# Patient Record
Sex: MALE | Race: OTHER | Employment: UNEMPLOYED | URBAN - METROPOLITAN AREA
[De-identification: names, ages, dates, MRNs, and addresses within clinical notes are randomized per-mention and may not be internally consistent; named-entity substitution may affect disease eponyms.]

---

## 2018-08-11 ENCOUNTER — APPOINTMENT (OUTPATIENT)
Dept: GENERAL RADIOLOGY | Age: 53
End: 2018-08-11
Attending: EMERGENCY MEDICINE
Payer: MEDICARE

## 2018-08-11 ENCOUNTER — HOSPITAL ENCOUNTER (EMERGENCY)
Age: 53
Discharge: HOME | End: 2018-08-11
Attending: EMERGENCY MEDICINE | Admitting: EMERGENCY MEDICINE
Payer: MEDICARE

## 2018-08-11 ENCOUNTER — APPOINTMENT (OUTPATIENT)
Dept: CT IMAGING | Age: 53
End: 2018-08-11
Attending: EMERGENCY MEDICINE
Payer: MEDICARE

## 2018-08-11 VITALS
RESPIRATION RATE: 20 BRPM | SYSTOLIC BLOOD PRESSURE: 162 MMHG | HEIGHT: 73 IN | DIASTOLIC BLOOD PRESSURE: 116 MMHG | OXYGEN SATURATION: 97 % | TEMPERATURE: 99 F | HEART RATE: 77 BPM

## 2018-08-11 DIAGNOSIS — R10.32 ABDOMINAL PAIN, LLQ (LEFT LOWER QUADRANT): Primary | ICD-10-CM

## 2018-08-11 DIAGNOSIS — E66.01 MORBID OBESITY (HCC): ICD-10-CM

## 2018-08-11 LAB
ALBUMIN SERPL-MCNC: 3.3 G/DL (ref 3.4–5)
ALBUMIN/GLOB SERPL: 0.8 {RATIO} (ref 0.8–1.7)
ALP SERPL-CCNC: 85 U/L (ref 45–117)
ALT SERPL-CCNC: 21 U/L (ref 16–61)
ANION GAP SERPL CALC-SCNC: 7 MMOL/L (ref 3–18)
APPEARANCE UR: CLEAR
AST SERPL-CCNC: 16 U/L (ref 15–37)
BACTERIA URNS QL MICRO: ABNORMAL /HPF
BASOPHILS # BLD: 0 K/UL (ref 0–0.1)
BASOPHILS NFR BLD: 0 % (ref 0–2)
BILIRUB SERPL-MCNC: 0.3 MG/DL (ref 0.2–1)
BILIRUB UR QL: NEGATIVE
BNP SERPL-MCNC: 457 PG/ML (ref 0–900)
BUN SERPL-MCNC: 10 MG/DL (ref 7–18)
BUN/CREAT SERPL: 7 (ref 12–20)
CALCIUM SERPL-MCNC: 8.3 MG/DL (ref 8.5–10.1)
CHLORIDE SERPL-SCNC: 106 MMOL/L (ref 100–108)
CO2 SERPL-SCNC: 28 MMOL/L (ref 21–32)
COLOR UR: YELLOW
CREAT SERPL-MCNC: 1.51 MG/DL (ref 0.6–1.3)
DIFFERENTIAL METHOD BLD: ABNORMAL
EOSINOPHIL # BLD: 0 K/UL (ref 0–0.4)
EOSINOPHIL NFR BLD: 0 % (ref 0–5)
EPITH CASTS URNS QL MICRO: ABNORMAL /LPF (ref 0–5)
ERYTHROCYTE [DISTWIDTH] IN BLOOD BY AUTOMATED COUNT: 13.6 % (ref 11.6–14.5)
GLOBULIN SER CALC-MCNC: 4.3 G/DL (ref 2–4)
GLUCOSE SERPL-MCNC: 118 MG/DL (ref 74–99)
GLUCOSE UR STRIP.AUTO-MCNC: NEGATIVE MG/DL
HCT VFR BLD AUTO: 43.6 % (ref 36–48)
HGB BLD-MCNC: 14.8 G/DL (ref 13–16)
HGB UR QL STRIP: ABNORMAL
KETONES UR QL STRIP.AUTO: NEGATIVE MG/DL
LACTATE BLD-SCNC: 1.9 MMOL/L (ref 0.4–2)
LEUKOCYTE ESTERASE UR QL STRIP.AUTO: NEGATIVE
LIPASE SERPL-CCNC: 104 U/L (ref 73–393)
LYMPHOCYTES # BLD: 1 K/UL (ref 0.9–3.6)
LYMPHOCYTES NFR BLD: 8 % (ref 21–52)
MAGNESIUM SERPL-MCNC: 2.2 MG/DL (ref 1.6–2.6)
MCH RBC QN AUTO: 29.7 PG (ref 24–34)
MCHC RBC AUTO-ENTMCNC: 33.9 G/DL (ref 31–37)
MCV RBC AUTO: 87.4 FL (ref 74–97)
MONOCYTES # BLD: 0.8 K/UL (ref 0.05–1.2)
MONOCYTES NFR BLD: 7 % (ref 3–10)
NEUTS SEG # BLD: 10.5 K/UL (ref 1.8–8)
NEUTS SEG NFR BLD: 85 % (ref 40–73)
NITRITE UR QL STRIP.AUTO: NEGATIVE
PH UR STRIP: 7 [PH] (ref 5–8)
PLATELET # BLD AUTO: 192 K/UL (ref 135–420)
PMV BLD AUTO: 10.8 FL (ref 9.2–11.8)
POTASSIUM SERPL-SCNC: 3.8 MMOL/L (ref 3.5–5.5)
PROT SERPL-MCNC: 7.6 G/DL (ref 6.4–8.2)
PROT UR STRIP-MCNC: ABNORMAL MG/DL
RBC # BLD AUTO: 4.99 M/UL (ref 4.7–5.5)
RBC #/AREA URNS HPF: ABNORMAL /HPF (ref 0–5)
SODIUM SERPL-SCNC: 141 MMOL/L (ref 136–145)
SP GR UR REFRACTOMETRY: 1.02 (ref 1–1.03)
UROBILINOGEN UR QL STRIP.AUTO: 0.2 EU/DL (ref 0.2–1)
WBC # BLD AUTO: 12.3 K/UL (ref 4.6–13.2)

## 2018-08-11 PROCEDURE — 85025 COMPLETE CBC W/AUTO DIFF WBC: CPT

## 2018-08-11 PROCEDURE — 96361 HYDRATE IV INFUSION ADD-ON: CPT

## 2018-08-11 PROCEDURE — 80053 COMPREHEN METABOLIC PANEL: CPT

## 2018-08-11 PROCEDURE — 99285 EMERGENCY DEPT VISIT HI MDM: CPT

## 2018-08-11 PROCEDURE — 74011250636 HC RX REV CODE- 250/636: Performed by: EMERGENCY MEDICINE

## 2018-08-11 PROCEDURE — 83605 ASSAY OF LACTIC ACID: CPT

## 2018-08-11 PROCEDURE — 83880 ASSAY OF NATRIURETIC PEPTIDE: CPT

## 2018-08-11 PROCEDURE — 96374 THER/PROPH/DIAG INJ IV PUSH: CPT

## 2018-08-11 PROCEDURE — 83735 ASSAY OF MAGNESIUM: CPT

## 2018-08-11 PROCEDURE — 83690 ASSAY OF LIPASE: CPT

## 2018-08-11 PROCEDURE — 71045 X-RAY EXAM CHEST 1 VIEW: CPT

## 2018-08-11 PROCEDURE — 81001 URINALYSIS AUTO W/SCOPE: CPT

## 2018-08-11 PROCEDURE — 93005 ELECTROCARDIOGRAM TRACING: CPT

## 2018-08-11 RX ORDER — HYDROMORPHONE HYDROCHLORIDE 1 MG/ML
1 INJECTION, SOLUTION INTRAMUSCULAR; INTRAVENOUS; SUBCUTANEOUS ONCE
Status: COMPLETED | OUTPATIENT
Start: 2018-08-11 | End: 2018-08-11

## 2018-08-11 RX ADMIN — SODIUM CHLORIDE 1000 ML: 900 INJECTION, SOLUTION INTRAVENOUS at 13:34

## 2018-08-11 RX ADMIN — HYDROMORPHONE HYDROCHLORIDE 1 MG: 1 INJECTION, SOLUTION INTRAMUSCULAR; INTRAVENOUS; SUBCUTANEOUS at 13:34

## 2018-08-11 NOTE — ED NOTES
I performed a brief evaluation, including history and physical, of the patient here in triage and I have determined that pt will need further treatment and evaluation from the main side ER physician. I have placed initial orders to help in expediting patients care.      August 11, 2018 at 12:47 PM - Fabio Sellers PA-C        Visit Vitals    BP (!) 154/95 (BP 1 Location: Left arm, BP Patient Position: At rest)    Pulse 87    Temp 99 °F (37.2 °C)    Resp 20    Ht 6' 1\" (1.854 m)    SpO2 95%

## 2018-08-11 NOTE — ED PROVIDER NOTES
EMERGENCY DEPARTMENT HISTORY AND PHYSICAL EXAM    1:00 PM      Date: 8/11/2018  Patient Name: Elli Andino    History of Presenting Illness     Chief Complaint   Patient presents with    Abdominal Pain         History Provided By: Patient    Chief Complaint: abdominal pain  Duration:  Last night, 1 day  Timing:  Acute  Location: left lower abdomen  Quality: non-radiating  Severity: Severe  Modifying Factors: history of hypertension,   Associated Symptoms: denies any other associated signs or symptoms      Additional History (Context): Elli Andino is a 46 y.o. male with a history of hypertension, MI, pacemaker who presents to the ED complaining of acute, severe, non-radiating, LLQ abdominal pain that started last night at 12 AM. Patient states that the pain was constant last night but then improved on it's own. Patient reports associated nausea but denies any vomiting, fevers, chills, urinary symptoms, testicular pain. Patient has had normal bowel movements. Patient has a pacemaker that was put in 2013 after sufffering an MI. No other complaints or concerns at this time. PCP: Jason Urrutia MD        Past History     Past Medical History:  No past medical history on file. Past Surgical History:  No past surgical history on file. Family History:  No family history on file. Social History:  Social History   Substance Use Topics    Smoking status: Not on file    Smokeless tobacco: Not on file    Alcohol use Not on file       Allergies:  No Known Allergies      Review of Systems       Review of Systems   Constitutional: Negative. Negative for fever. HENT: Negative. Eyes: Negative. Respiratory: Negative. Cardiovascular: Negative. Gastrointestinal: Positive for abdominal pain and nausea. Negative for abdominal distention, anal bleeding, blood in stool, constipation, diarrhea, rectal pain and vomiting. Normal BM this AM without change in pain. Endocrine: Negative.     Genitourinary: Negative. Negative for dysuria, flank pain, frequency, hematuria, penile pain, testicular pain and urgency. Musculoskeletal: Negative. Skin: Negative. Allergic/Immunologic: Negative. Neurological: Negative. Hematological: Negative. Psychiatric/Behavioral: Negative. All other systems reviewed and are negative. Physical Exam     Visit Vitals    BP (!) 154/95 (BP 1 Location: Left arm, BP Patient Position: At rest)    Pulse 87    Temp 99 °F (37.2 °C)    Resp 20    Ht 6' 1\" (1.854 m)    SpO2 95%         Physical Exam   Constitutional: He is oriented to person, place, and time. Extreme obesity- 500 Lb + LLQ pain acute onset midnight - Constant - some element of colic No radiation top flank. No other symptoms. HENT:   Head: Normocephalic and atraumatic. Eyes: Conjunctivae and EOM are normal. Pupils are equal, round, and reactive to light. Neck: Normal range of motion. Neck supple. No JVD present. No tracheal deviation present. No thyromegaly present. Cardiovascular: Normal rate, regular rhythm and normal heart sounds. Exam reveals no gallop. No murmur heard. Pulmonary/Chest: Effort normal and breath sounds normal. No respiratory distress. He has no wheezes. He has no rales. Abdominal: Soft. He exhibits no distension and no mass. There is tenderness. There is no rebound and no guarding. Massive abdominal obesity- LLQ tenderness is only accurate assessment. No CVA tendeness. Constant pain with an element of colic since midnight- was well prior to acute onset, Suspect ureteral calculus vs divertic. NOT SEPTIC    Musculoskeletal: Normal range of motion. He exhibits edema. Lymphadenopathy:     He has no cervical adenopathy. Neurological: He is alert and oriented to person, place, and time. Skin: Skin is warm.          Diagnostic Study Results     Labs -  Recent Results (from the past 12 hour(s))   CBC WITH AUTOMATED DIFF    Collection Time: 08/11/18  1:00 PM   Result Value Ref Range    WBC 12.3 4.6 - 13.2 K/uL    RBC 4.99 4.70 - 5.50 M/uL    HGB 14.8 13.0 - 16.0 g/dL    HCT 43.6 36.0 - 48.0 %    MCV 87.4 74.0 - 97.0 FL    MCH 29.7 24.0 - 34.0 PG    MCHC 33.9 31.0 - 37.0 g/dL    RDW 13.6 11.6 - 14.5 %    PLATELET 251 196 - 986 K/uL    MPV 10.8 9.2 - 11.8 FL    NEUTROPHILS 85 (H) 40 - 73 %    LYMPHOCYTES 8 (L) 21 - 52 %    MONOCYTES 7 3 - 10 %    EOSINOPHILS 0 0 - 5 %    BASOPHILS 0 0 - 2 %    ABS. NEUTROPHILS 10.5 (H) 1.8 - 8.0 K/UL    ABS. LYMPHOCYTES 1.0 0.9 - 3.6 K/UL    ABS. MONOCYTES 0.8 0.05 - 1.2 K/UL    ABS. EOSINOPHILS 0.0 0.0 - 0.4 K/UL    ABS. BASOPHILS 0.0 0.0 - 0.1 K/UL    DF AUTOMATED     METABOLIC PANEL, COMPREHENSIVE    Collection Time: 08/11/18  1:00 PM   Result Value Ref Range    Sodium 141 136 - 145 mmol/L    Potassium 3.8 3.5 - 5.5 mmol/L    Chloride 106 100 - 108 mmol/L    CO2 28 21 - 32 mmol/L    Anion gap 7 3.0 - 18 mmol/L    Glucose 118 (H) 74 - 99 mg/dL    BUN 10 7.0 - 18 MG/DL    Creatinine 1.51 (H) 0.6 - 1.3 MG/DL    BUN/Creatinine ratio 7 (L) 12 - 20      GFR est AA 59 (L) >60 ml/min/1.73m2    GFR est non-AA 48 (L) >60 ml/min/1.73m2    Calcium 8.3 (L) 8.5 - 10.1 MG/DL    Bilirubin, total 0.3 0.2 - 1.0 MG/DL    ALT (SGPT) 21 16 - 61 U/L    AST (SGOT) 16 15 - 37 U/L    Alk.  phosphatase 85 45 - 117 U/L    Protein, total 7.6 6.4 - 8.2 g/dL    Albumin 3.3 (L) 3.4 - 5.0 g/dL    Globulin 4.3 (H) 2.0 - 4.0 g/dL    A-G Ratio 0.8 0.8 - 1.7     LIPASE    Collection Time: 08/11/18  1:00 PM   Result Value Ref Range    Lipase 104 73 - 393 U/L   MAGNESIUM    Collection Time: 08/11/18  1:00 PM   Result Value Ref Range    Magnesium 2.2 1.6 - 2.6 mg/dL   NT-PRO BNP    Collection Time: 08/11/18  1:00 PM   Result Value Ref Range    NT pro- 0 - 900 PG/ML   POC LACTIC ACID    Collection Time: 08/11/18  1:36 PM   Result Value Ref Range    Lactic Acid (POC) 1.9 0.4 - 2.0 mmol/L   URINALYSIS W/ RFLX MICROSCOPIC    Collection Time: 08/11/18  1:39 PM   Result Value Ref Range    Color YELLOW      Appearance CLEAR      Specific gravity 1.019 1.005 - 1.030      pH (UA) 7.0 5.0 - 8.0      Protein TRACE (A) NEG mg/dL    Glucose NEGATIVE  NEG mg/dL    Ketone NEGATIVE  NEG mg/dL    Bilirubin NEGATIVE  NEG      Blood SMALL (A) NEG      Urobilinogen 0.2 0.2 - 1.0 EU/dL    Nitrites NEGATIVE  NEG      Leukocyte Esterase NEGATIVE  NEG     URINE MICROSCOPIC ONLY    Collection Time: 08/11/18  1:39 PM   Result Value Ref Range    RBC 4 to 10 0 - 5 /hpf    Epithelial cells FEW 0 - 5 /lpf    Bacteria FEW (A) NEG /hpf   EKG, 12 LEAD, INITIAL    Collection Time: 08/11/18  2:14 PM   Result Value Ref Range    Ventricular Rate 101 BPM    Atrial Rate 80 BPM    P-R Interval 140 ms    QRS Duration 98 ms    Q-T Interval 308 ms    QTC Calculation (Bezet) 399 ms    Calculated P Axis 48 degrees    Calculated R Axis -42 degrees    Calculated T Axis -2 degrees    Diagnosis       Electronic ventricular pacemaker  No previous ECGs available         Radiologic Studies -   XR CHEST PORT   Final Result        CXR Cardiomegaly. Medical Decision Making   I am the first provider for this patient. I reviewed the vital signs, available nursing notes, past medical history, past surgical history, family history and social history. Vital Signs-Reviewed the patient's vital signs. Pulse Oximetry Analysis -  95% on room air (Interpretation)normal.    Cardiac Monitor:  Rate:   Rhythm:  Sinus Tachycardia rate 101    EKG: Interpreted by the EP. Time Interpreted:    RSR rate 101, LAD, Normal intervals, LVH by voltage criteria,  Peeked T waves   Rate:    Rhythm: Sinus Tachycardia rate 101   Interpretation:   Comparison: none- From [place]. Records Reviewed: none (Time of Review: 1:00 PM)    ED Course: Progress Notes, Reevaluation, and Consults:  Remained stable - Given IV fluids. At 3:50 PM evaluated for pain and abdominal findings: Totally free of pain and abdominal tenderness. To be re examined in the AM . Unable to obtain CT here . Called [facility] radiology, they are also limited to 400 lbs. Fortunately the patient is asymptomatic at this time. Afebrile, mild shift to the left. But not septic. Expectant therapy- Clear liquids, no meds, recheck in AM if recurrent pain/ or sooner    1  Diagnosis     Clinical Impression:   Recent Results (from the past 12 hour(s))   CBC WITH AUTOMATED DIFF    Collection Time: 08/11/18  1:00 PM   Result Value Ref Range    WBC 12.3 4.6 - 13.2 K/uL    RBC 4.99 4.70 - 5.50 M/uL    HGB 14.8 13.0 - 16.0 g/dL    HCT 43.6 36.0 - 48.0 %    MCV 87.4 74.0 - 97.0 FL    MCH 29.7 24.0 - 34.0 PG    MCHC 33.9 31.0 - 37.0 g/dL    RDW 13.6 11.6 - 14.5 %    PLATELET 672 559 - 302 K/uL    MPV 10.8 9.2 - 11.8 FL    NEUTROPHILS 85 (H) 40 - 73 %    LYMPHOCYTES 8 (L) 21 - 52 %    MONOCYTES 7 3 - 10 %    EOSINOPHILS 0 0 - 5 %    BASOPHILS 0 0 - 2 %    ABS. NEUTROPHILS 10.5 (H) 1.8 - 8.0 K/UL    ABS. LYMPHOCYTES 1.0 0.9 - 3.6 K/UL    ABS. MONOCYTES 0.8 0.05 - 1.2 K/UL    ABS. EOSINOPHILS 0.0 0.0 - 0.4 K/UL    ABS. BASOPHILS 0.0 0.0 - 0.1 K/UL    DF AUTOMATED     METABOLIC PANEL, COMPREHENSIVE    Collection Time: 08/11/18  1:00 PM   Result Value Ref Range    Sodium 141 136 - 145 mmol/L    Potassium 3.8 3.5 - 5.5 mmol/L    Chloride 106 100 - 108 mmol/L    CO2 28 21 - 32 mmol/L    Anion gap 7 3.0 - 18 mmol/L    Glucose 118 (H) 74 - 99 mg/dL    BUN 10 7.0 - 18 MG/DL    Creatinine 1.51 (H) 0.6 - 1.3 MG/DL    BUN/Creatinine ratio 7 (L) 12 - 20      GFR est AA 59 (L) >60 ml/min/1.73m2    GFR est non-AA 48 (L) >60 ml/min/1.73m2    Calcium 8.3 (L) 8.5 - 10.1 MG/DL    Bilirubin, total 0.3 0.2 - 1.0 MG/DL    ALT (SGPT) 21 16 - 61 U/L    AST (SGOT) 16 15 - 37 U/L    Alk.  phosphatase 85 45 - 117 U/L    Protein, total 7.6 6.4 - 8.2 g/dL    Albumin 3.3 (L) 3.4 - 5.0 g/dL    Globulin 4.3 (H) 2.0 - 4.0 g/dL    A-G Ratio 0.8 0.8 - 1.7     LIPASE    Collection Time: 08/11/18  1:00 PM   Result Value Ref Range    Lipase 104 73 - 393 U/L MAGNESIUM    Collection Time: 08/11/18  1:00 PM   Result Value Ref Range    Magnesium 2.2 1.6 - 2.6 mg/dL   NT-PRO BNP    Collection Time: 08/11/18  1:00 PM   Result Value Ref Range    NT pro- 0 - 900 PG/ML   POC LACTIC ACID    Collection Time: 08/11/18  1:36 PM   Result Value Ref Range    Lactic Acid (POC) 1.9 0.4 - 2.0 mmol/L   URINALYSIS W/ RFLX MICROSCOPIC    Collection Time: 08/11/18  1:39 PM   Result Value Ref Range    Color YELLOW      Appearance CLEAR      Specific gravity 1.019 1.005 - 1.030      pH (UA) 7.0 5.0 - 8.0      Protein TRACE (A) NEG mg/dL    Glucose NEGATIVE  NEG mg/dL    Ketone NEGATIVE  NEG mg/dL    Bilirubin NEGATIVE  NEG      Blood SMALL (A) NEG      Urobilinogen 0.2 0.2 - 1.0 EU/dL    Nitrites NEGATIVE  NEG      Leukocyte Esterase NEGATIVE  NEG     URINE MICROSCOPIC ONLY    Collection Time: 08/11/18  1:39 PM   Result Value Ref Range    RBC 4 to 10 0 - 5 /hpf    Epithelial cells FEW 0 - 5 /lpf    Bacteria FEW (A) NEG /hpf   EKG, 12 LEAD, INITIAL    Collection Time: 08/11/18  2:14 PM   Result Value Ref Range    Ventricular Rate 101 BPM    Atrial Rate 80 BPM    P-R Interval 140 ms    QRS Duration 98 ms    Q-T Interval 308 ms    QTC Calculation (Bezet) 399 ms    Calculated P Axis 48 degrees    Calculated R Axis -42 degrees    Calculated T Axis -2 degrees    Diagnosis       Electronic ventricular pacemaker  No previous ECGs available         Medications ordered:   Medications   sodium chloride 0.9 % bolus infusion 1,000 mL (1,000 mL IntraVENous New Bag 8/11/18 1334)   HYDROmorphone (PF) (DILAUDID) injection 1 mg (1 mg IntraVENous Given 8/11/18 1334)         Xr Chest Port    Result Date: 8/11/2018  CHEST PORTABLE CPT CODE: 26898 COMPARISON: None INDICATIONS: Abdominal pain nausea and vomiting FINDINGS: There is a left-sided bipolar pacemaker. The heart is enlarged. The study is somewhat limited due to the patient's large size. There may be some mild pulmonary vascular congestion.  No pleural effusions are seen. No significant osseous abnormalities. Impression: Limited study due to patient's size. Cardiomegaly. There is suggestion of mild pulmonary vascular congestion. 1. Abdominal pain, LLQ (left lower quadrant)    2. Morbid obesity ([address]            Disposition: home    Follow-up Information     Follow up With Details Comments Contact Info    Phys Other, MD   Patient can only remember the practice name and not the physician      [facility] In 1 day Recheck in AM or sooner if pain/fever recurs. [address] [facility] [id number]  [phone]           Patient's Medications    No medications on file     _______________________________    Attestations:  Scribe Attestation     Catherine Vickers acting as a scribe for and in the presence of Cecilia Whitehead MD      August 11, 2018 at 2:03 PM       Provider Attestation:      I personally performed the services described in the documentation, reviewed the documentation, as recorded by the scribe in my presence, and it accurately and completely records my words and actions.  August 11, 2018 at 2:03 PM - Cecilia Whitehead MD    _______________________________

## 2018-08-12 LAB
ATRIAL RATE: 80 BPM
CALCULATED P AXIS, ECG09: 48 DEGREES
CALCULATED R AXIS, ECG10: -42 DEGREES
CALCULATED T AXIS, ECG11: -2 DEGREES
DIAGNOSIS, 93000: NORMAL
P-R INTERVAL, ECG05: 140 MS
Q-T INTERVAL, ECG07: 308 MS
QRS DURATION, ECG06: 98 MS
QTC CALCULATION (BEZET), ECG08: 399 MS
VENTRICULAR RATE, ECG03: 101 BPM

## 2018-09-21 ENCOUNTER — DOCUMENTATION ONLY (OUTPATIENT)
Dept: INTERNAL MEDICINE CLINIC | Age: 53
End: 2018-09-21

## 2018-09-21 NOTE — PROGRESS NOTES
Pharmacy Note: Immunization Update    Patient: Marquis Busch (26 y.o., 1965)     Patient's immunization history was updated to reflect information contained in the [place] and/or outside immunization/pharmacy records were reconciled within [address] [facility]. Health Maintenance schedule updated when appropriate.     Current immunizations now reflect:       Immunization History   Administered Date(s) Administered    Influenza Vaccine (Quad) 09/04/2018       Yoni Meyers, JasD, BCACP

## 2019-06-19 ENCOUNTER — HOSPITAL ENCOUNTER (OUTPATIENT)
Dept: GENERAL RADIOLOGY | Age: 54
Discharge: HOME | End: 2019-06-19
Payer: MEDICARE

## 2019-06-19 DIAGNOSIS — M25.561 RIGHT KNEE PAIN: ICD-10-CM

## 2019-06-19 DIAGNOSIS — M25.562 LEFT KNEE PAIN: ICD-10-CM

## 2019-06-19 PROCEDURE — 73562 X-RAY EXAM OF KNEE 3: CPT

## 2023-03-01 ENCOUNTER — APPOINTMENT (OUTPATIENT)
Facility: HOSPITAL | Age: 58
DRG: 308 | End: 2023-03-01
Payer: MEDICARE

## 2023-03-01 ENCOUNTER — HOSPITAL ENCOUNTER (INPATIENT)
Facility: HOSPITAL | Age: 58
LOS: 4 days | Discharge: HOME | DRG: 308 | End: 2023-03-05
Attending: EMERGENCY MEDICINE | Admitting: STUDENT IN AN ORGANIZED HEALTH CARE EDUCATION/TRAINING PROGRAM
Payer: MEDICARE

## 2023-03-01 DIAGNOSIS — I50.33 ACUTE ON CHRONIC HEART FAILURE WITH PRESERVED EJECTION FRACTION (HCC): ICD-10-CM

## 2023-03-01 DIAGNOSIS — I48.91 NEW ONSET A-FIB (HCC): Primary | ICD-10-CM

## 2023-03-01 DIAGNOSIS — E66.01 MORBID OBESITY WITH BMI OF 70 AND OVER, ADULT (HCC): ICD-10-CM

## 2023-03-01 DIAGNOSIS — I48.91 ATRIAL FIBRILLATION, UNSPECIFIED TYPE (HCC): ICD-10-CM

## 2023-03-01 DIAGNOSIS — I10 UNCONTROLLED HYPERTENSION: ICD-10-CM

## 2023-03-01 DIAGNOSIS — I48.91 NEW ONSET ATRIAL FIBRILLATION (HCC): ICD-10-CM

## 2023-03-01 LAB
ALBUMIN SERPL-MCNC: 3.8 G/DL (ref 3.4–5)
ALBUMIN/GLOB SERPL: 1.1 (ref 0.8–1.7)
ALP SERPL-CCNC: 79 U/L (ref 45–117)
ALT SERPL-CCNC: 25 U/L (ref 16–61)
ANION GAP SERPL CALC-SCNC: 5 MMOL/L (ref 3–18)
AST SERPL-CCNC: 24 U/L (ref 10–38)
BASOPHILS # BLD: 0.1 K/UL (ref 0–0.1)
BASOPHILS NFR BLD: 1 % (ref 0–2)
BILIRUB SERPL-MCNC: 0.7 MG/DL (ref 0.2–1)
BUN SERPL-MCNC: 17 MG/DL (ref 7–18)
BUN/CREAT SERPL: 15 (ref 12–20)
CALCIUM SERPL-MCNC: 9.2 MG/DL (ref 8.5–10.1)
CHLORIDE SERPL-SCNC: 104 MMOL/L (ref 100–111)
CHOLEST SERPL-MCNC: 145 MG/DL
CO2 SERPL-SCNC: 30 MMOL/L (ref 21–32)
CREAT SERPL-MCNC: 1.12 MG/DL (ref 0.6–1.3)
D DIMER PPP FEU-MCNC: 0.37 UG/ML(FEU)
DIFFERENTIAL METHOD BLD: NORMAL
EOSINOPHIL # BLD: 0 K/UL (ref 0–0.4)
EOSINOPHIL NFR BLD: 0 % (ref 0–5)
ERYTHROCYTE [DISTWIDTH] IN BLOOD BY AUTOMATED COUNT: 13.2 % (ref 11.6–14.5)
GLOBULIN SER CALC-MCNC: 3.6 G/DL (ref 2–4)
GLUCOSE SERPL-MCNC: 87 MG/DL (ref 74–99)
HCT VFR BLD AUTO: 47.3 % (ref 36–48)
HDLC SERPL-MCNC: 46 MG/DL (ref 40–60)
HDLC SERPL: 3.2 (ref 0–5)
HGB BLD-MCNC: 15.5 G/DL (ref 13–16)
IMM GRANULOCYTES # BLD AUTO: 0 K/UL (ref 0–0.04)
IMM GRANULOCYTES NFR BLD AUTO: 0 % (ref 0–0.5)
LDLC SERPL CALC-MCNC: 88.8 MG/DL (ref 0–100)
LIPID PANEL: NORMAL
LYMPHOCYTES # BLD: 1.6 K/UL (ref 0.9–3.6)
LYMPHOCYTES NFR BLD: 21 % (ref 21–52)
MCH RBC QN AUTO: 29.7 PG (ref 24–34)
MCHC RBC AUTO-ENTMCNC: 32.8 G/DL (ref 31–37)
MCV RBC AUTO: 90.6 FL (ref 78–100)
MONOCYTES # BLD: 0.5 K/UL (ref 0.05–1.2)
MONOCYTES NFR BLD: 6 % (ref 3–10)
NEUTS SEG # BLD: 5.4 K/UL (ref 1.8–8)
NEUTS SEG NFR BLD: 72 % (ref 40–73)
NRBC # BLD: 0 K/UL (ref 0–0.01)
NRBC BLD-RTO: 0 PER 100 WBC
NT PRO BNP: 159 PG/ML (ref 0–900)
PLATELET # BLD AUTO: 171 K/UL (ref 135–420)
PMV BLD AUTO: 11.5 FL (ref 9.2–11.8)
POTASSIUM SERPL-SCNC: 4.1 MMOL/L (ref 3.5–5.5)
PROT SERPL-MCNC: 7.4 G/DL (ref 6.4–8.2)
RBC # BLD AUTO: 5.22 M/UL (ref 4.35–5.65)
SODIUM SERPL-SCNC: 139 MMOL/L (ref 136–145)
TRIGL SERPL-MCNC: 51 MG/DL
TROPONIN I SERPL HS-MCNC: 24 NG/L (ref 0–78)
VLDLC SERPL CALC-MCNC: 10.2 MG/DL
WBC # BLD AUTO: 7.6 K/UL (ref 4.6–13.2)

## 2023-03-01 PROCEDURE — 99285 EMERGENCY DEPT VISIT HI MDM: CPT

## 2023-03-01 PROCEDURE — 2140000001 HC CVICU INTERMEDIATE R&B

## 2023-03-01 PROCEDURE — 80061 LIPID PANEL: CPT

## 2023-03-01 PROCEDURE — 93005 ELECTROCARDIOGRAM TRACING: CPT | Performed by: EMERGENCY MEDICINE

## 2023-03-01 PROCEDURE — 2580000003 HC RX 258: Performed by: STUDENT IN AN ORGANIZED HEALTH CARE EDUCATION/TRAINING PROGRAM

## 2023-03-01 PROCEDURE — 85379 FIBRIN DEGRADATION QUANT: CPT

## 2023-03-01 PROCEDURE — 85025 COMPLETE CBC W/AUTO DIFF WBC: CPT

## 2023-03-01 PROCEDURE — 71045 X-RAY EXAM CHEST 1 VIEW: CPT

## 2023-03-01 PROCEDURE — 83880 ASSAY OF NATRIURETIC PEPTIDE: CPT

## 2023-03-01 PROCEDURE — 80053 COMPREHEN METABOLIC PANEL: CPT

## 2023-03-01 PROCEDURE — 84484 ASSAY OF TROPONIN QUANT: CPT

## 2023-03-01 PROCEDURE — 6370000000 HC RX 637 (ALT 250 FOR IP): Performed by: STUDENT IN AN ORGANIZED HEALTH CARE EDUCATION/TRAINING PROGRAM

## 2023-03-01 PROCEDURE — 6360000002 HC RX W HCPCS: Performed by: STUDENT IN AN ORGANIZED HEALTH CARE EDUCATION/TRAINING PROGRAM

## 2023-03-01 RX ORDER — SODIUM CHLORIDE 9 MG/ML
INJECTION, SOLUTION INTRAVENOUS PRN
Status: DISCONTINUED | OUTPATIENT
Start: 2023-03-01 | End: 2023-03-05 | Stop reason: HOSPADM

## 2023-03-01 RX ORDER — FAMOTIDINE 20 MG/1
20 TABLET, FILM COATED ORAL 2 TIMES DAILY
Status: DISCONTINUED | OUTPATIENT
Start: 2023-03-01 | End: 2023-03-05 | Stop reason: HOSPADM

## 2023-03-01 RX ORDER — SODIUM CHLORIDE 0.9 % (FLUSH) 0.9 %
5-40 SYRINGE (ML) INJECTION PRN
Status: DISCONTINUED | OUTPATIENT
Start: 2023-03-01 | End: 2023-03-05 | Stop reason: HOSPADM

## 2023-03-01 RX ORDER — POLYETHYLENE GLYCOL 3350 17 G/17G
17 POWDER, FOR SOLUTION ORAL DAILY PRN
Status: DISCONTINUED | OUTPATIENT
Start: 2023-03-01 | End: 2023-03-05 | Stop reason: HOSPADM

## 2023-03-01 RX ORDER — METOPROLOL TARTRATE 50 MG/1
50 TABLET, FILM COATED ORAL 2 TIMES DAILY
Status: DISCONTINUED | OUTPATIENT
Start: 2023-03-02 | End: 2023-03-05 | Stop reason: HOSPADM

## 2023-03-01 RX ORDER — ENOXAPARIN SODIUM 100 MG/ML
60 INJECTION SUBCUTANEOUS 2 TIMES DAILY
Status: DISCONTINUED | OUTPATIENT
Start: 2023-03-01 | End: 2023-03-05

## 2023-03-01 RX ORDER — ONDANSETRON 2 MG/ML
4 INJECTION INTRAMUSCULAR; INTRAVENOUS EVERY 6 HOURS PRN
Status: DISCONTINUED | OUTPATIENT
Start: 2023-03-01 | End: 2023-03-05 | Stop reason: HOSPADM

## 2023-03-01 RX ORDER — ACETAMINOPHEN 325 MG/1
650 TABLET ORAL EVERY 6 HOURS PRN
Status: DISCONTINUED | OUTPATIENT
Start: 2023-03-01 | End: 2023-03-05 | Stop reason: HOSPADM

## 2023-03-01 RX ORDER — ONDANSETRON 4 MG/1
4 TABLET, ORALLY DISINTEGRATING ORAL EVERY 8 HOURS PRN
Status: DISCONTINUED | OUTPATIENT
Start: 2023-03-01 | End: 2023-03-05 | Stop reason: HOSPADM

## 2023-03-01 RX ORDER — SODIUM CHLORIDE 0.9 % (FLUSH) 0.9 %
5-40 SYRINGE (ML) INJECTION EVERY 12 HOURS SCHEDULED
Status: DISCONTINUED | OUTPATIENT
Start: 2023-03-01 | End: 2023-03-05 | Stop reason: HOSPADM

## 2023-03-01 RX ORDER — IBUPROFEN 200 MG
400 TABLET ORAL EVERY 6 HOURS PRN
Status: ON HOLD | COMMUNITY
End: 2023-03-05 | Stop reason: HOSPADM

## 2023-03-01 RX ADMIN — SODIUM CHLORIDE, PRESERVATIVE FREE 10 ML: 5 INJECTION INTRAVENOUS at 22:48

## 2023-03-01 RX ADMIN — FAMOTIDINE 20 MG: 20 TABLET ORAL at 22:47

## 2023-03-01 RX ADMIN — ENOXAPARIN SODIUM 60 MG: 100 INJECTION SUBCUTANEOUS at 22:47

## 2023-03-01 ASSESSMENT — PAIN - FUNCTIONAL ASSESSMENT: PAIN_FUNCTIONAL_ASSESSMENT: NONE - DENIES PAIN

## 2023-03-01 ASSESSMENT — LIFESTYLE VARIABLES
HOW OFTEN DO YOU HAVE A DRINK CONTAINING ALCOHOL: NEVER
HOW MANY STANDARD DRINKS CONTAINING ALCOHOL DO YOU HAVE ON A TYPICAL DAY: PATIENT DOES NOT DRINK

## 2023-03-01 NOTE — ED PROVIDER NOTES
EMERGENCY DEPARTMENT HISTORY AND PHYSICAL EXAM    10:19 PM patient seen at this time in room 10      Date: 3/1/2023  Patient Name: Russ Mora    History of Presenting Illness     Chief Complaint   Patient presents with    Irregular Heart Beat     Patient was sent from PMD after he was found in afib when they did EKG, has not seen a dr. In years pt has PM for about 10 yrs          History Provided By: patient    Additional History (Context): Russ Mora is a 61 y.o. male presents with history of pacemaker, hypertension, off of all medications has not seen primary care in 3 years  Saw primary care for the first time today and diagnosed with A-fib on the twelve-lead EKG. He has noted swelling in his legs over the last few weeks. No pain in the muscles of his legs. When he is up and moving around he does have pain in the right knee. No shortness of breath chest pain or palpitations. PCP: None None    Chief Complaint:   Duration:    Timing:    Location:   Quality:   Severity:   Modifying Factors:   Associated Symptoms:       No current facility-administered medications for this encounter. No current outpatient medications on file. Past History     Past Medical History:  History reviewed. No pertinent past medical history. Past Surgical History:  No past surgical history on file. Family History:  No family history on file. Social History:        Allergies:  No Known Allergies      Review of Systems     Review of Systems      Physical Exam       Patient Vitals for the past 12 hrs:   Temp Pulse Resp BP SpO2   03/01/23 2200 -- 80 20 (!) 167/114 96 %   03/01/23 2100 -- 80 26 (!) 170/108 96 %   03/01/23 2000 -- 84 27 (!) 158/107 98 %   03/01/23 1900 -- 91 22 (!) 142/90 95 %   03/01/23 1830 -- 96 25 (!) 172/111 95 %   03/01/23 1825 98.6 °F (37 °C) (!) 106 20 (!) 175/109 97 %       IPVITALS  Patient Vitals for the past 24 hrs:   BP Temp Temp src Pulse Resp SpO2 Height Weight   03/01/23 2200 (!) 167/114 -- -- 80 20 96 % -- --   03/01/23 2100 (!) 170/108 -- -- 80 26 96 % -- --   03/01/23 2000 (!) 158/107 -- -- 84 27 98 % -- --   03/01/23 1900 (!) 142/90 -- -- 91 22 95 % -- --   03/01/23 1830 (!) 172/111 -- -- 96 25 95 % -- --   03/01/23 1825 (!) 175/109 98.6 °F (37 °C) Oral (!) 106 20 97 % 6' 1\" (1.854 m) (!) 558 lb (253.1 kg)       Physical Exam  Constitutional:       Appearance: Normal appearance. He is obese. Comments: 558 pounds   HENT:      Head: Normocephalic and atraumatic. Cardiovascular:      Rate and Rhythm: Normal rate. Rhythm irregular. Comments: Bilateral intact radial pulses, severe edema hampers evaluation of pedal pulses. Feet are warm and nontender. Pulmonary:      Effort: Pulmonary effort is normal.      Breath sounds: Normal breath sounds. Abdominal:      Tenderness: There is no abdominal tenderness. Musculoskeletal:         General: Normal range of motion. Cervical back: Normal range of motion and neck supple. Right lower leg: Edema present. Left lower leg: Edema present. Skin:     General: Skin is warm and dry. Neurological:      General: No focal deficit present. Mental Status: He is alert.          Diagnostic Study Results   Labs -  Recent Results (from the past 24 hour(s))   CMP    Collection Time: 03/01/23  6:30 PM   Result Value Ref Range    Sodium 139 136 - 145 mmol/L    Potassium 4.1 3.5 - 5.5 mmol/L    Chloride 104 100 - 111 mmol/L    CO2 30 21 - 32 mmol/L    Anion Gap 5 3.0 - 18 mmol/L    Glucose 87 74 - 99 mg/dL    BUN 17 7.0 - 18 MG/DL    Creatinine 1.12 0.6 - 1.3 MG/DL    Bun/Cre Ratio 15 12 - 20      Est, Glom Filt Rate >60 >60 ml/min/1.73m2    Calcium 9.2 8.5 - 10.1 MG/DL    Total Bilirubin 0.7 0.2 - 1.0 MG/DL    ALT 25 16 - 61 U/L    AST 24 10 - 38 U/L    Alk Phosphatase 79 45 - 117 U/L    Total Protein 7.4 6.4 - 8.2 g/dL    Albumin 3.8 3.4 - 5.0 g/dL    Globulin 3.6 2.0 - 4.0 g/dL    Albumin/Globulin Ratio 1.1 0.8 - 1.7     CBC with Auto Differential    Collection Time: 03/01/23  6:30 PM   Result Value Ref Range    WBC 7.6 4.6 - 13.2 K/uL    RBC 5.22 4.35 - 5.65 M/uL    Hemoglobin 15.5 13.0 - 16.0 g/dL    Hematocrit 47.3 36.0 - 48.0 %    MCV 90.6 78.0 - 100.0 FL    MCH 29.7 24.0 - 34.0 PG    MCHC 32.8 31.0 - 37.0 g/dL    RDW 13.2 11.6 - 14.5 %    Platelets 753 342 - 552 K/uL    MPV 11.5 9.2 - 11.8 FL    Nucleated RBCs 0.0 0  WBC    nRBC 0.00 0.00 - 0.01 K/uL    Seg Neutrophils 72 40 - 73 %    Lymphocytes 21 21 - 52 %    Monocytes 6 3 - 10 %    Eosinophils % 0 0 - 5 %    Basophils 1 0 - 2 %    Immature Granulocytes 0 0.0 - 0.5 %    Segs Absolute 5.4 1.8 - 8.0 K/UL    Absolute Lymph # 1.6 0.9 - 3.6 K/UL    Absolute Mono # 0.5 0.05 - 1.2 K/UL    Absolute Eos # 0.0 0.0 - 0.4 K/UL    Basophils Absolute 0.1 0.0 - 0.1 K/UL    Absolute Immature Granulocyte 0.0 0.00 - 0.04 K/UL    Differential Type AUTOMATED     Troponin    Collection Time: 03/01/23  6:30 PM   Result Value Ref Range    Troponin, High Sensitivity 24 0 - 78 ng/L   Brain Natriuretic Peptide    Collection Time: 03/01/23  6:30 PM   Result Value Ref Range    NT Pro- 0 - 900 PG/ML       Radiologic Studies -   XR CHEST PORTABLE    (Results Pending)     [unfilled]    Medications ordered:   Medications - No data to display      Medical Decision Making   Initial Medical Decision Making and DDx:  Saw primary care for the first time in about 5 years. Has not had his pacemaker checked out in longer than that. He is supposed to be on antihypertensive but has not been in several years. Asymptomatic A-fib found today, no palpitation shortness of breath chest pain. He has noted swelling in his legs. Will evaluate for renal failure diabetes, clinically has elements of CHF. Not an extremis and no oxygen requirement.     ED Course: Progress Notes, Reevaluation, and Consults:  ED Course as of 03/01/23 2219   Wed Mar 01, 2023   2054 Twelve-lead EKG atrial fibrillation at a rate of 109 rapid ventricular response narrow complex no other acute process by my interpretation. On telemetry, patient has frequent PVCs I do not see pacer spikes. Unknown status of his pacemaker. [CB]   2056 My interpretation single frontal portable view of the chest, pacemaker in place cardiomegaly, fairly well-defined cardiac diaphragmatic and chest wall borders, no pneumothorax. Appears unchanged compared to 2018 [CB]      ED Course User Index  [CB] Karley Santoro MD     10:19 PM discussed with the patient risk of stroke need for treatment of his atrial fibrillation, repeat EKG continues with atrial fibrillation at a controlled rate. Discussed with Dr. Jethro nuno on-call, accepts admission to telemetry patient remains in stable condition. I am the first provider for this patient. I reviewed the vital signs, available nursing notes, past medical history, past surgical history, family history and social history. Patient Vitals for the past 12 hrs:   Temp Pulse Resp BP SpO2   03/01/23 2200 -- 80 20 (!) 167/114 96 %   03/01/23 2100 -- 80 26 (!) 170/108 96 %   03/01/23 2000 -- 84 27 (!) 158/107 98 %   03/01/23 1900 -- 91 22 (!) 142/90 95 %   03/01/23 1830 -- 96 25 (!) 172/111 95 %   03/01/23 1825 98.6 °F (37 °C) (!) 106 20 (!) 175/109 97 %       Vital Signs-Reviewed the patient's vital signs. Pulse Oximetry Analysis, Cardiac Monitor, 12 lead ekg:      Interpreted by the EP. Records Reviewed: Nursing notes reviewed (Time of Review: 10:19 PM)    Procedures:   Critical Care Time: 0  If critical care time is note it is exclusive of any separately billable procedures. Aspirin: (was aspirin given for stroke?)    Diagnosis     Clinical Impression:   1. New onset a-fib ([address]    2. Morbid obesity with BMI of 70 and over, adult ([facility] [address]    3.  Uncontrolled hypertension        Disposition: DISPOSITION Admitted 03/01/2023 10:18:45 PM       New for 2023:  DISCHARGE MEDICATIONS:  There are no discharge medications for this patient. DISCONTINUED MEDICATIONS:  There are no discharge medications for this patient. PATIENT REFERRED TO:  Follow Up with:  No follow-up provider specified.   _______________________________    Notes:    Jinny Mustafa MD using Dragon dictation      _______________________________     Yann Olvera MD  03/01/23 7626

## 2023-03-02 ENCOUNTER — APPOINTMENT (OUTPATIENT)
Facility: HOSPITAL | Age: 58
DRG: 308 | End: 2023-03-02
Payer: MEDICARE

## 2023-03-02 PROBLEM — I50.33 ACUTE ON CHRONIC HEART FAILURE WITH PRESERVED EJECTION FRACTION (HCC): Status: ACTIVE | Noted: 2023-03-02

## 2023-03-02 PROBLEM — I10 HYPERTENSION: Status: ACTIVE | Noted: 2023-03-02

## 2023-03-02 LAB
ALBUMIN SERPL-MCNC: 3.1 G/DL (ref 3.4–5)
ALBUMIN/GLOB SERPL: 0.9 (ref 0.8–1.7)
ALP SERPL-CCNC: 65 U/L (ref 45–117)
ALT SERPL-CCNC: 22 U/L (ref 16–61)
ANION GAP SERPL CALC-SCNC: 5 MMOL/L (ref 3–18)
APPEARANCE UR: CLEAR
AST SERPL-CCNC: 17 U/L (ref 10–38)
BACTERIA URNS QL MICRO: ABNORMAL /HPF
BASOPHILS # BLD: 0 K/UL (ref 0–0.1)
BASOPHILS NFR BLD: 1 % (ref 0–2)
BILIRUB SERPL-MCNC: 0.9 MG/DL (ref 0.2–1)
BILIRUB UR QL: NEGATIVE
BUN SERPL-MCNC: 15 MG/DL (ref 7–18)
BUN/CREAT SERPL: 14 (ref 12–20)
CALCIUM SERPL-MCNC: 8.8 MG/DL (ref 8.5–10.1)
CHLORIDE SERPL-SCNC: 107 MMOL/L (ref 100–111)
CO2 SERPL-SCNC: 28 MMOL/L (ref 21–32)
COLOR UR: YELLOW
CREAT SERPL-MCNC: 1.08 MG/DL (ref 0.6–1.3)
CREAT UR-MCNC: 239 MG/DL (ref 30–125)
DIFFERENTIAL METHOD BLD: NORMAL
ECHO AO ASC DIAM: 4.1 CM
ECHO AO ASCENDING AORTA INDEX: 1.23 CM/M2
ECHO AO ROOT DIAM: 3.7 CM
ECHO AO ROOT INDEX: 1.11 CM/M2
ECHO BSA: 3.61 M2
ECHO EST RA PRESSURE: 8 MMHG
ECHO LV FRACTIONAL SHORTENING: 22 % (ref 28–44)
ECHO LV INTERNAL DIMENSION DIASTOLE INDEX: 1.5 CM/M2
ECHO LV INTERNAL DIMENSION DIASTOLIC: 5 CM (ref 4.2–5.9)
ECHO LV INTERNAL DIMENSION SYSTOLIC INDEX: 1.17 CM/M2
ECHO LV INTERNAL DIMENSION SYSTOLIC: 3.9 CM
ECHO LV IVSD: 1.8 CM (ref 0.6–1)
ECHO LV MASS 2D: 355.3 G (ref 88–224)
ECHO LV MASS INDEX 2D: 106.7 G/M2 (ref 49–115)
ECHO LV POSTERIOR WALL DIASTOLIC: 1.4 CM (ref 0.6–1)
ECHO LV RELATIVE WALL THICKNESS RATIO: 0.56
ECHO RV FREE WALL PEAK S': 10 CM/S
ECHO RV TAPSE: 1.6 CM (ref 1.7–?)
EKG ATRIAL RATE: 59 BPM
EKG ATRIAL RATE: 66 BPM
EKG ATRIAL RATE: 70 BPM
EKG ATRIAL RATE: 73 BPM
EKG ATRIAL RATE: 98 BPM
EKG DIAGNOSIS: NORMAL
EKG P AXIS: 0 DEGREES
EKG P-R INTERVAL: 174 MS
EKG Q-T INTERVAL: 346 MS
EKG Q-T INTERVAL: 388 MS
EKG Q-T INTERVAL: 402 MS
EKG Q-T INTERVAL: 416 MS
EKG Q-T INTERVAL: 478 MS
EKG QRS DURATION: 120 MS
EKG QRS DURATION: 122 MS
EKG QRS DURATION: 122 MS
EKG QRS DURATION: 124 MS
EKG QRS DURATION: 172 MS
EKG QTC CALCULATION (BAZETT): 447 MS
EKG QTC CALCULATION (BAZETT): 449 MS
EKG QTC CALCULATION (BAZETT): 465 MS
EKG QTC CALCULATION (BAZETT): 475 MS
EKG QTC CALCULATION (BAZETT): 572 MS
EKG R AXIS: -11 DEGREES
EKG R AXIS: -16 DEGREES
EKG R AXIS: -16 DEGREES
EKG R AXIS: -24 DEGREES
EKG R AXIS: -75 DEGREES
EKG T AXIS: -19 DEGREES
EKG T AXIS: -3 DEGREES
EKG T AXIS: 2 DEGREES
EKG T AXIS: 3 DEGREES
EKG T AXIS: 86 DEGREES
EKG VENTRICULAR RATE: 109 BPM
EKG VENTRICULAR RATE: 70 BPM
EKG VENTRICULAR RATE: 80 BPM
EKG VENTRICULAR RATE: 84 BPM
EKG VENTRICULAR RATE: 86 BPM
EOSINOPHIL # BLD: 0 K/UL (ref 0–0.4)
EOSINOPHIL NFR BLD: 1 % (ref 0–5)
EPITH CASTS URNS QL MICRO: ABNORMAL /LPF (ref 0–5)
ERYTHROCYTE [DISTWIDTH] IN BLOOD BY AUTOMATED COUNT: 13.3 % (ref 11.6–14.5)
GLOBULIN SER CALC-MCNC: 3.4 G/DL (ref 2–4)
GLUCOSE SERPL-MCNC: 97 MG/DL (ref 74–99)
GLUCOSE UR STRIP.AUTO-MCNC: NEGATIVE MG/DL
HCT VFR BLD AUTO: 42.8 % (ref 36–48)
HGB BLD-MCNC: 14.3 G/DL (ref 13–16)
HGB UR QL STRIP: ABNORMAL
IMM GRANULOCYTES # BLD AUTO: 0 K/UL (ref 0–0.04)
IMM GRANULOCYTES NFR BLD AUTO: 0 % (ref 0–0.5)
KETONES UR QL STRIP.AUTO: ABNORMAL MG/DL
LEUKOCYTE ESTERASE UR QL STRIP.AUTO: NEGATIVE
LV EF: 58 %
LVEF MODALITY: ABNORMAL
LYMPHOCYTES # BLD: 1.5 K/UL (ref 0.9–3.6)
LYMPHOCYTES NFR BLD: 26 % (ref 21–52)
MAGNESIUM SERPL-MCNC: 2.1 MG/DL (ref 1.6–2.6)
MCH RBC QN AUTO: 29.5 PG (ref 24–34)
MCHC RBC AUTO-ENTMCNC: 33.4 G/DL (ref 31–37)
MCV RBC AUTO: 88.2 FL (ref 78–100)
MICROALBUMIN UR-MCNC: 5.79 MG/DL (ref 0–3)
MICROALBUMIN/CREAT UR-RTO: 24 MG/G (ref 0–30)
MONOCYTES # BLD: 0.4 K/UL (ref 0.05–1.2)
MONOCYTES NFR BLD: 8 % (ref 3–10)
NEUTS SEG # BLD: 3.8 K/UL (ref 1.8–8)
NEUTS SEG NFR BLD: 65 % (ref 40–73)
NITRITE UR QL STRIP.AUTO: NEGATIVE
NRBC # BLD: 0 K/UL (ref 0–0.01)
NRBC BLD-RTO: 0 PER 100 WBC
PH UR STRIP: 5.5 (ref 5–8)
PLATELET # BLD AUTO: 169 K/UL (ref 135–420)
PMV BLD AUTO: 10.6 FL (ref 9.2–11.8)
POTASSIUM SERPL-SCNC: 3.9 MMOL/L (ref 3.5–5.5)
PROT SERPL-MCNC: 6.5 G/DL (ref 6.4–8.2)
PROT UR STRIP-MCNC: ABNORMAL MG/DL
RBC # BLD AUTO: 4.85 M/UL (ref 4.35–5.65)
RBC #/AREA URNS HPF: ABNORMAL /HPF (ref 0–5)
SODIUM SERPL-SCNC: 140 MMOL/L (ref 136–145)
SP GR UR REFRACTOMETRY: 1.02 (ref 1–1.03)
TROPONIN I SERPL HS-MCNC: 31 NG/L (ref 0–78)
TSH SERPL DL<=0.05 MIU/L-ACNC: 1.71 UIU/ML (ref 0.36–3.74)
UROBILINOGEN UR QL STRIP.AUTO: 1 EU/DL (ref 0.2–1)
WBC # BLD AUTO: 5.8 K/UL (ref 4.6–13.2)
WBC URNS QL MICRO: ABNORMAL /HPF (ref 0–4)

## 2023-03-02 PROCEDURE — 80053 COMPREHEN METABOLIC PANEL: CPT

## 2023-03-02 PROCEDURE — 93321 DOPPLER ECHO F-UP/LMTD STD: CPT | Performed by: INTERNAL MEDICINE

## 2023-03-02 PROCEDURE — 36415 COLL VENOUS BLD VENIPUNCTURE: CPT

## 2023-03-02 PROCEDURE — 81001 URINALYSIS AUTO W/SCOPE: CPT

## 2023-03-02 PROCEDURE — 84443 ASSAY THYROID STIM HORMONE: CPT

## 2023-03-02 PROCEDURE — 93308 TTE F-UP OR LMTD: CPT | Performed by: INTERNAL MEDICINE

## 2023-03-02 PROCEDURE — 85025 COMPLETE CBC W/AUTO DIFF WBC: CPT

## 2023-03-02 PROCEDURE — 2140000001 HC CVICU INTERMEDIATE R&B

## 2023-03-02 PROCEDURE — 83735 ASSAY OF MAGNESIUM: CPT

## 2023-03-02 PROCEDURE — 84484 ASSAY OF TROPONIN QUANT: CPT

## 2023-03-02 PROCEDURE — 82043 UR ALBUMIN QUANTITATIVE: CPT

## 2023-03-02 PROCEDURE — 6360000002 HC RX W HCPCS: Performed by: PHYSICIAN ASSISTANT

## 2023-03-02 PROCEDURE — 99222 1ST HOSP IP/OBS MODERATE 55: CPT | Performed by: INTERNAL MEDICINE

## 2023-03-02 PROCEDURE — 93325 DOPPLER ECHO COLOR FLOW MAPG: CPT | Performed by: INTERNAL MEDICINE

## 2023-03-02 PROCEDURE — 93321 DOPPLER ECHO F-UP/LMTD STD: CPT

## 2023-03-02 PROCEDURE — 6360000002 HC RX W HCPCS: Performed by: STUDENT IN AN ORGANIZED HEALTH CARE EDUCATION/TRAINING PROGRAM

## 2023-03-02 PROCEDURE — 6370000000 HC RX 637 (ALT 250 FOR IP): Performed by: STUDENT IN AN ORGANIZED HEALTH CARE EDUCATION/TRAINING PROGRAM

## 2023-03-02 PROCEDURE — 94761 N-INVAS EAR/PLS OXIMETRY MLT: CPT

## 2023-03-02 PROCEDURE — 2580000003 HC RX 258: Performed by: STUDENT IN AN ORGANIZED HEALTH CARE EDUCATION/TRAINING PROGRAM

## 2023-03-02 PROCEDURE — 93005 ELECTROCARDIOGRAM TRACING: CPT | Performed by: STUDENT IN AN ORGANIZED HEALTH CARE EDUCATION/TRAINING PROGRAM

## 2023-03-02 RX ORDER — FUROSEMIDE 10 MG/ML
40 INJECTION INTRAMUSCULAR; INTRAVENOUS 2 TIMES DAILY
Status: DISCONTINUED | OUTPATIENT
Start: 2023-03-02 | End: 2023-03-04

## 2023-03-02 RX ADMIN — FUROSEMIDE 40 MG: 10 INJECTION, SOLUTION INTRAMUSCULAR; INTRAVENOUS at 11:07

## 2023-03-02 RX ADMIN — FAMOTIDINE 20 MG: 20 TABLET ORAL at 21:35

## 2023-03-02 RX ADMIN — SODIUM CHLORIDE, PRESERVATIVE FREE 10 ML: 5 INJECTION INTRAVENOUS at 08:38

## 2023-03-02 RX ADMIN — ENOXAPARIN SODIUM 60 MG: 100 INJECTION SUBCUTANEOUS at 08:31

## 2023-03-02 RX ADMIN — METOPROLOL TARTRATE 50 MG: 50 TABLET, FILM COATED ORAL at 21:35

## 2023-03-02 RX ADMIN — SODIUM CHLORIDE, PRESERVATIVE FREE 10 ML: 5 INJECTION INTRAVENOUS at 21:36

## 2023-03-02 RX ADMIN — FUROSEMIDE 40 MG: 10 INJECTION, SOLUTION INTRAMUSCULAR; INTRAVENOUS at 18:02

## 2023-03-02 RX ADMIN — ENOXAPARIN SODIUM 60 MG: 100 INJECTION SUBCUTANEOUS at 21:35

## 2023-03-02 RX ADMIN — ACETAMINOPHEN 650 MG: 325 TABLET ORAL at 11:07

## 2023-03-02 RX ADMIN — METOPROLOL TARTRATE 50 MG: 50 TABLET, FILM COATED ORAL at 08:34

## 2023-03-02 RX ADMIN — FAMOTIDINE 20 MG: 20 TABLET ORAL at 08:34

## 2023-03-02 ASSESSMENT — PAIN SCALES - GENERAL: PAINLEVEL_OUTOF10: 0

## 2023-03-02 ASSESSMENT — PAIN DESCRIPTION - ORIENTATION: ORIENTATION: POSTERIOR

## 2023-03-02 ASSESSMENT — PAIN DESCRIPTION - DESCRIPTORS: DESCRIPTORS: ACHING

## 2023-03-02 ASSESSMENT — PAIN DESCRIPTION - LOCATION: LOCATION: BACK

## 2023-03-02 NOTE — CONSULTS
[facility] - Consult Note    Cardiology consultation request from Dr. Abelino Beatty for evaluation and management/treatment of new afib    Date of  Admission: 3/1/2023  6:20 PM   Primary Care Physician:  None None    Attending Cardiologist: Dr. Patricia Moreira:     -Afib, chronic per pt report, dating back to 2013 when had PPM implanted  -S/p Deborah HARRELL Biotronik PPM 10/2013 EMILIANA TAYLOR [facility], Dr. Tita Grant)  -History of \"mild heart attack\" in 2013, around time of PPM implantation, pt does not recall having any stress test/cardiac cath at that time  -HTN, uncontrolled, /109 on presentation 3/1/2023  -Morbid obesity    No Primary cardiologist; consult by Dr. Leonides Wisdom:     -Have contacted [employer] representative for device interrogation, to be completed this AM, reports pt apparently in permanent afib. 3 years left on device. Lary Nava reported very high impedence to atrial lead, will plan to discuss further with Dr. Paty Almaguer to possibly switch device to VVI. -Agree with initiation of PO Lopressor as per primary team.  -Will consider initiation of anticoagulation, [id number] score is 1 so far, Echo pending.  -Echo pending, will review results as able. -Will check TSH for completeness.  -Further recommendations based on test results, hospital course. History of Present Illness: This is a 61 y.o. male admitted for Atrial fibrillation ([address] [I48.91]  New onset atrial fibrillation ([facility] [I48.91]  New onset a-fib ([address] [I48.91]  Uncontrolled hypertension [I10]  Morbid obesity with BMI of 70 and over, adult ([facility] [address] [E66.01, Z68.45]  Atrial fibrillation, unspecified type ([address] [I48.91]. Patient complains of: TAYLER Mcgrath is a 61 y.o. male who was sent to the hospital due to afib with mildly elevated rates up to ~120. Pt was given dose of Bystolic at PCP's office prior to arrival in ER. He denies any c/o chest pain/discomfort, palpitations.   He reports chronic shortness of breath with exertion, which is unchanged from baseline. He has noted increased swelling to BLE since the beginning of the year, with some leaking of fluid from his legs. He denies any fever. He reports history of afib and \"mild heart attack\" although he does not recall having any ischemia evaluation at the time. Cardiac risk factors: hypertension and obesity (BMI >= 30 kg/m2)      Review of Symptoms:  Except as stated above include:  Constitutional:  negative  Respiratory:  negative  Cardiovascular:  + BLE edema, chronic KASPER, no CP/discomfort or palpitations  Gastrointestinal: negative  Genitourinary:  negative  Musculoskeletal:  Negative  Neurological:  Negative  Dermatological:  Negative  Endocrinological: Negative  Psychological:  Negative       Past Medical History:   History reviewed. No pertinent past medical history. Social History:     Social History     Socioeconomic History    Marital status:      Spouse name: None    Number of children: None    Years of education: None    Highest education level: None        Family History:   No family history on file.      Medications:   No Known Allergies     Current Facility-Administered Medications   Medication Dose Route Frequency    sodium chloride flush 0.9 % injection 5-40 mL  5-40 mL IntraVENous 2 times per day    sodium chloride flush 0.9 % injection 5-40 mL  5-40 mL IntraVENous PRN    0.9 % sodium chloride infusion   IntraVENous PRN    enoxaparin (LOVENOX) injection 60 mg  60 mg SubCUTAneous BID    ondansetron (ZOFRAN-ODT) disintegrating tablet 4 mg  4 mg Oral Q8H PRN    Or    ondansetron (ZOFRAN) injection 4 mg  4 mg IntraVENous Q6H PRN    polyethylene glycol (GLYCOLAX) packet 17 g  17 g Oral Daily PRN    acetaminophen (TYLENOL) tablet 650 mg  650 mg Oral Q6H PRN    Or    acetaminophen (TYLENOL) suppository 650 mg  650 mg Rectal Q6H PRN    famotidine (PEPCID) tablet 20 mg  20 mg Oral BID    metoprolol tartrate (LOPRESSOR) tablet 50 mg  50 mg Oral BID         Physical Exam:     Vitals:    03/02/23 0758   BP: (!) 143/87   Pulse: 71   Resp: 20   Temp: 97.5 °F (36.4 °C)   SpO2: 95%       BP Readings from Last 3 Encounters:   03/02/23 (!) 143/87     Pulse Readings from Last 3 Encounters:   03/02/23 71     Wt Readings from Last 3 Encounters:   03/02/23 (!) 558 lb (253.1 kg)       General:  alert, appears stated age, cooperative, and no distress  Neck:  supple  Lungs:  decreased  Heart:  irregular rhythm  Abdomen:  abdomen is soft without significant tenderness, masses, organomegaly or guarding  Extremities:  atraumatic, ++ edema to BLE  Skin: warm and dry  Neuro: alert, oriented x3, affect appropriate, no focal neurological deficits, moves all extremities well, and no involuntary movements  Psych: non focal     Data Review:     Recent Labs     03/01/23  1830 03/02/23  0553   WBC 7.6 5.8   HGB 15.5 14.3   HCT 47.3 42.8    169     Recent Labs     03/01/23  1830 03/02/23  0553    140   K 4.1 3.9    107   CO2 30 28   BUN 17 15   CREATININE 1.12 1.08   MG  --  2.1   ALT 25 22       All Cardiac Markers in the last 24 hours:    Lab Results   Component Value Date/Time    TROPHS 24 03/01/2023 06:30 PM     No results found for: BNP    Last Lipid:    Lab Results   Component Value Date/Time    CHOL 145 03/01/2023 10:56 PM    HDL 46 03/01/2023 10:56 PM       Cardiographics:     Encounter Date: 03/01/23   EKG 12 Lead   Result Value    Ventricular Rate 80    Atrial Rate 59    QRS Duration 124    Q-T Interval 388    QTc Calculation (Bazett) 447    R Axis -16    T Axis -3    Diagnosis      Atrial fibrillation with occasional ventricular-paced complexes       Signed By: Timmy Rachel PA-C     March 2, 2023

## 2023-03-02 NOTE — ED NOTES
Run V-tach, repeat EKG completed and reviewed by doctor, pt denies chest pain     Ivet Truong RN  03/01/23 2102       Ivet Truong RN  03/02/23 0000

## 2023-03-02 NOTE — H&P
[unfilled]   History and Physical    Patient: Willie Sandoval MRN: [id number]  SSN: [id number]    YOB: 1965  Age: 61 y.o. Sex: male      Subjective:      Willie Sandoval is a 61 y.o. male who has past medical history of hypertension, pacemaker for unclear reason, morbid obesity, sent to the ER from the clinic by myself, his PCP who recently reestablished care after 3 years of not being seen by his providers. EKG done in the clinic which showed A-fib with RVR with heart rates 1 13-1 20. In the clinic, patient was given 10 mg of Bystolic. Upon arrival to the emergency room his heart rate had improved slightly but was still tachycardic, no further interventions were done and heart rate improved, currently rate controlled. Patient has no acute complaints other than he has poor exercise tolerance. Is unable to walk more than a few steps before becoming short of breath. Has difficulty with mobility and simple tasks due to his weight. Of note, weight has been stable since his last clinic visit in 2019. Presented to the clinic with complaints of bilateral lower leg swelling and pain. Says that the leg swelling is always there but is worse at this time than it has been in the past.  Denies any fevers, chills, headache, nausea, vomiting, chest pain, dizziness, or loss of consciousness. Patient was reluctant to come to the emergency room however came by ambulance. ER course:  Labs unremarkable including troponin, chemistry, glucose, and CBC  EKG showed atrial fibrillation with no acute ST, T wave changes  Patient admitted to medicine service to have cardiology work-up and investigation of his pacemaker. History reviewed. No pertinent past medical history. No past surgical history on file. No family history on file.   Social History     Tobacco Use    Smoking status: Not on file    Smokeless tobacco: Not on file   Substance Use Topics    Alcohol use: Not on file      Prior to Admission medications    Medication Sig Start Date End Date Taking? Authorizing Provider   ibuprofen (ADVIL;MOTRIN) 200 MG tablet Take 400 mg by mouth every 6 hours as needed for Pain   Yes Historical Provider, MD        No Known Allergies    Review of Systems:  REVIEW OF SYSTEMS:  CONSTITUTIONAL: Denies weight loss, fever, chills, weakness or fatigue. HEENT:  Denies acute vision changes, No hearing loss or tinnitus, no nasal congestion or throat pain. SKIN: No rash or itching. CARDIOVASCULAR: Denies chest pain, chest pressure or chest discomfort. No palpitations or edema. RESPIRATORY: denies shortness of breath, cough or sputum. GASTROINTESTINAL:  denies pain, distension, nausea, diarrhea  GENITOURINARY: denies dysuria, bleeding and incontinence. MUSCULOSKELETAL: Denies joint pain  NEUROLOGICAL: denies focal deficits  PSYCHIATRIC: Denies changes in mentation, normal mood. Objective:     Vitals:    03/02/23 0300 03/02/23 0400 03/02/23 0500 03/02/23 0600   BP: (!) 148/92 (!) 141/89 (!) 124/91    Pulse: 84 79 76 83   Resp: 24 21 12 16   Temp:  97.5 °F (36.4 °C)     TempSrc:  Oral     SpO2: 95% 96% (!) 88% 95%   Weight:       Height:            Physical Exam:  General: Morbidly obese, no acute distress   skin: Chronic venous changes bilateral lower extremities healing ulcer left pannus  HEENT: Normal appearing oropharynx; no LAD, no lesions; EOMI  Head: Normocephalic, atraumatic  Cardiovascular: regular rate and rhythm, no obvious murmurs noted, 2+ pitting edema bilateral lower extremities  Pulmonary: CTA bilaterally, no obvious wheezing, rales, or rhonchi  Abdomen: Bowel sounds difficult to appreciate, belly obese with no tenderness, nondistended, no rebound or guarding. MSK:  No deformities, spine non tender  Neuro: CN 2-12 intact, no focal deficits.    Psychiatric: normal mood, appropriate behavior, Alert and oriented x 3      Assessment:         Plan:     Atrial fibrillation with RVR:  History of pacemaker:  Lower extremity edema  New diagnosis of A-fib, currently rate controlled after receiving dose of 20YL Bystolic in the clinic. No signs of infection.    -We will trend EKG and troponin   -Echocardiogram ordered, rule out heart failure and structural abnormalities   -Cardiology consult for further recommendations regarding atrial fibrillation and for possible investigation of his pacemaker.   -Metoprolol 50 twice daily   -Lovenox started   -We will check dimer, consider duplex ultrasound of bilateral lower extremities   -Cardiac diet, telemetry monitoring    Hypertension:  Not currently on any medications  Started on metoprolol for his rhythm control  We will start hydralazine as needed and monitor blood pressure    Obesity:   We will continue to watch /treat outpatient   Diet and lifestyle counselling  PT OT    Time for admission more than 75 minutes included review previous record,hospital record ,test result previous discharge manolo , ,discussion with patient and exam. Discussion with nursing staff and documentation      Signed By: Vish Londono MD     March 2, 2023

## 2023-03-02 NOTE — PROGRESS NOTES
7070 Received report from [facility]. Patient alert and oriented x 4.    1100 Tylenol given for back pain. Lasix given - advised on side effects, extra urinal given. 1300 Patient resting comfortably in the bed. Call bell placed within reach. 1600 PIV infiltrated. Will reinsert new PIV. 1938 Bedside shift change report given to [facility] RN (oncoming nurse). Report included the following information SBAR.

## 2023-03-02 NOTE — PROGRESS NOTES
Telephone report received from [facility]: Patient arrived from ER via specialty bed - Bariatric bed. AAOx4. Behavior appropriate to situations. HOB elevated. No SOB on RA. Denies any discomfort at this time. Oriented patient to room & surroundings. Call light within reach. 0600: Needs attended. 7715: Bedside and Verbal shift change report given to [address] [place] [address] (oncoming nurse) by Jory Bashir RN (offgoing nurse). Report included the following information Nurse Handoff Report, Intake/Output, MAR, Recent Results, and Cardiac Rhythm Afib w/ underlying A Paced .

## 2023-03-03 PROBLEM — I48.20 CHRONIC ATRIAL FIBRILLATION (HCC): Status: ACTIVE | Noted: 2023-03-01

## 2023-03-03 PROBLEM — I48.20 CHRONIC A-FIB (HCC): Status: ACTIVE | Noted: 2023-03-01

## 2023-03-03 LAB
ALBUMIN SERPL-MCNC: 3.3 G/DL (ref 3.4–5)
ALBUMIN/GLOB SERPL: 0.9 (ref 0.8–1.7)
ALP SERPL-CCNC: 68 U/L (ref 45–117)
ALT SERPL-CCNC: 28 U/L (ref 16–61)
ANION GAP SERPL CALC-SCNC: 5 MMOL/L (ref 3–18)
AST SERPL-CCNC: 24 U/L (ref 10–38)
BASOPHILS # BLD: 0 K/UL (ref 0–0.1)
BASOPHILS NFR BLD: 1 % (ref 0–2)
BILIRUB SERPL-MCNC: 1 MG/DL (ref 0.2–1)
BUN SERPL-MCNC: 15 MG/DL (ref 7–18)
BUN/CREAT SERPL: 13 (ref 12–20)
CALCIUM SERPL-MCNC: 8.6 MG/DL (ref 8.5–10.1)
CHLORIDE SERPL-SCNC: 102 MMOL/L (ref 100–111)
CO2 SERPL-SCNC: 31 MMOL/L (ref 21–32)
CREAT SERPL-MCNC: 1.16 MG/DL (ref 0.6–1.3)
DIFFERENTIAL METHOD BLD: NORMAL
EOSINOPHIL # BLD: 0.1 K/UL (ref 0–0.4)
EOSINOPHIL NFR BLD: 1 % (ref 0–5)
ERYTHROCYTE [DISTWIDTH] IN BLOOD BY AUTOMATED COUNT: 13.1 % (ref 11.6–14.5)
GLOBULIN SER CALC-MCNC: 3.6 G/DL (ref 2–4)
GLUCOSE SERPL-MCNC: 92 MG/DL (ref 74–99)
HCT VFR BLD AUTO: 44.7 % (ref 36–48)
HGB BLD-MCNC: 14.7 G/DL (ref 13–16)
IMM GRANULOCYTES # BLD AUTO: 0 K/UL (ref 0–0.04)
IMM GRANULOCYTES NFR BLD AUTO: 0 % (ref 0–0.5)
LYMPHOCYTES # BLD: 1.6 K/UL (ref 0.9–3.6)
LYMPHOCYTES NFR BLD: 29 % (ref 21–52)
MAGNESIUM SERPL-MCNC: 2.2 MG/DL (ref 1.6–2.6)
MCH RBC QN AUTO: 29.4 PG (ref 24–34)
MCHC RBC AUTO-ENTMCNC: 32.9 G/DL (ref 31–37)
MCV RBC AUTO: 89.4 FL (ref 78–100)
MONOCYTES # BLD: 0.5 K/UL (ref 0.05–1.2)
MONOCYTES NFR BLD: 8 % (ref 3–10)
NEUTS SEG # BLD: 3.5 K/UL (ref 1.8–8)
NEUTS SEG NFR BLD: 61 % (ref 40–73)
NRBC # BLD: 0 K/UL (ref 0–0.01)
NRBC BLD-RTO: 0 PER 100 WBC
PLATELET # BLD AUTO: 159 K/UL (ref 135–420)
PMV BLD AUTO: 11.3 FL (ref 9.2–11.8)
POTASSIUM SERPL-SCNC: 3.6 MMOL/L (ref 3.5–5.5)
PROT SERPL-MCNC: 6.9 G/DL (ref 6.4–8.2)
RBC # BLD AUTO: 5 M/UL (ref 4.35–5.65)
SODIUM SERPL-SCNC: 138 MMOL/L (ref 136–145)
WBC # BLD AUTO: 5.7 K/UL (ref 4.6–13.2)

## 2023-03-03 PROCEDURE — 80053 COMPREHEN METABOLIC PANEL: CPT

## 2023-03-03 PROCEDURE — 6370000000 HC RX 637 (ALT 250 FOR IP): Performed by: INTERNAL MEDICINE

## 2023-03-03 PROCEDURE — 2580000003 HC RX 258: Performed by: STUDENT IN AN ORGANIZED HEALTH CARE EDUCATION/TRAINING PROGRAM

## 2023-03-03 PROCEDURE — 97535 SELF CARE MNGMENT TRAINING: CPT

## 2023-03-03 PROCEDURE — 97161 PT EVAL LOW COMPLEX 20 MIN: CPT

## 2023-03-03 PROCEDURE — 97165 OT EVAL LOW COMPLEX 30 MIN: CPT

## 2023-03-03 PROCEDURE — 2700000000 HC OXYGEN THERAPY PER DAY

## 2023-03-03 PROCEDURE — 83735 ASSAY OF MAGNESIUM: CPT

## 2023-03-03 PROCEDURE — 6360000002 HC RX W HCPCS: Performed by: PHYSICIAN ASSISTANT

## 2023-03-03 PROCEDURE — 99232 SBSQ HOSP IP/OBS MODERATE 35: CPT | Performed by: INTERNAL MEDICINE

## 2023-03-03 PROCEDURE — 6370000000 HC RX 637 (ALT 250 FOR IP): Performed by: STUDENT IN AN ORGANIZED HEALTH CARE EDUCATION/TRAINING PROGRAM

## 2023-03-03 PROCEDURE — 6360000002 HC RX W HCPCS: Performed by: STUDENT IN AN ORGANIZED HEALTH CARE EDUCATION/TRAINING PROGRAM

## 2023-03-03 PROCEDURE — 85025 COMPLETE CBC W/AUTO DIFF WBC: CPT

## 2023-03-03 PROCEDURE — 2140000001 HC CVICU INTERMEDIATE R&B

## 2023-03-03 PROCEDURE — 94761 N-INVAS EAR/PLS OXIMETRY MLT: CPT

## 2023-03-03 PROCEDURE — 36415 COLL VENOUS BLD VENIPUNCTURE: CPT

## 2023-03-03 RX ADMIN — SACUBITRIL AND VALSARTAN 1 TABLET: 24; 26 TABLET, FILM COATED ORAL at 08:49

## 2023-03-03 RX ADMIN — METOPROLOL TARTRATE 50 MG: 50 TABLET, FILM COATED ORAL at 20:53

## 2023-03-03 RX ADMIN — FUROSEMIDE 40 MG: 10 INJECTION, SOLUTION INTRAMUSCULAR; INTRAVENOUS at 17:49

## 2023-03-03 RX ADMIN — ENOXAPARIN SODIUM 60 MG: 100 INJECTION SUBCUTANEOUS at 08:49

## 2023-03-03 RX ADMIN — FAMOTIDINE 20 MG: 20 TABLET ORAL at 20:53

## 2023-03-03 RX ADMIN — SACUBITRIL AND VALSARTAN 1 TABLET: 24; 26 TABLET, FILM COATED ORAL at 20:52

## 2023-03-03 RX ADMIN — FUROSEMIDE 40 MG: 10 INJECTION, SOLUTION INTRAMUSCULAR; INTRAVENOUS at 08:49

## 2023-03-03 RX ADMIN — FAMOTIDINE 20 MG: 20 TABLET ORAL at 08:49

## 2023-03-03 RX ADMIN — ENOXAPARIN SODIUM 60 MG: 100 INJECTION SUBCUTANEOUS at 20:51

## 2023-03-03 RX ADMIN — METOPROLOL TARTRATE 50 MG: 50 TABLET, FILM COATED ORAL at 08:49

## 2023-03-03 RX ADMIN — SODIUM CHLORIDE, PRESERVATIVE FREE 10 ML: 5 INJECTION INTRAVENOUS at 08:50

## 2023-03-03 ASSESSMENT — PAIN SCALES - GENERAL: PAINLEVEL_OUTOF10: 0

## 2023-03-03 NOTE — PROGRESS NOTES
Bedside and Verbal shift change report given to [facility] (oncoming nurse) by Brenda Humphrey RN (offgoing nurse). Report included the following information Nurse Handoff Report, Intake/Output, MAR, Recent Results, and Cardiac Rhythm Afib . Quietly resting in bed. HOB elevated. No SOB on RA. Denies any pain or discomfort at this time. 2135: Due meds given. 0000: No change from previous assessment. 0230: Comfortably sleeping.    0300: No change from previous assessment. 0400: Patient kept on de-sating down to 82% on RA asleep. Placed on oxygen at 2 LPM. Sats went up to 96 %    0600: Slept good thru night. Needs attended. Encouraged patient to get OOB to recliner. Assisted w/ AM care. And Back in bed.     0735: Bedside and Verbal shift change report given to [facility] (oncoming nurse) by Margueritte Hatchet RN (offgoing nurse). Report included the following information Nurse Handoff Report, Intake/Output, MAR, and Cardiac Rhythm Afib .

## 2023-03-03 NOTE — PROGRESS NOTES
Physical Therapy  PHYSICAL THERAPY EVALUATION/DISCHARGE    Patient: Mitchell Baker (58 y.o. male)  Date: 3/3/2023  Primary Diagnosis: Atrial fibrillation ([facility] [I48.91]  New onset atrial fibrillation ([facility] [address] [I48.91]  New onset a-fib ([address] [I48.91]  Uncontrolled hypertension [I10]  Morbid obesity with BMI of 70 and over, adult ([facility] [address] [E66.01, Z68.45]  Atrial fibrillation, unspecified type ([address] [I48.91]       Precautions: Fall Risk  PLOF: Independent with crutches. Lives in single story home with spouse. ASSESSMENT AND RECOMMENDATIONS:  Patient received in bed and willing to work with therapy. Patient is independent with bed mobility and functional transfers. He ambulates short distance in the room using crutches without LOB. States he has been using crutches for 5 years due to knee pain. He c/o SOB with mobility, though is aware of taking rest breaks and has multiple chairs set up throughout his home. Patient does not require further skilled physical therapy intervention at this level of care. Patient would benefit from Bariatric Crutches for safe ambulation. Further Equipment Recommendations for Discharge: Bariatric crutches          AMPAC: At this time and based on an AM-PAC score of 24/24 (or **/20 if omitting stairs), no further PT is recommended upon discharge due to (i.e. patient at baseline functional statusetc). Recommend patient returns to prior setting with prior services. This AMPA score should be considered in conjunction with interdisciplinary team recommendations to determine the most appropriate discharge setting. Patient's social support, diagnosis, medical stability, and prior level of function should also be taken into consideration. SUBJECTIVE:   Patient stated my knees are bad.     OBJECTIVE DATA SUMMARY:   History reviewed. No pertinent past medical history. No past surgical history on file.     Home Situation:  Social/Functional History  Lives With: Spouse  Type of Home: House  Home Layout: One level  Home Access: Stairs to enter with rails  Entrance Stairs - Number of Steps: 2  ADL Assistance: Independent  Ambulation Assistance: Independent  Critical Behavior:  Orientation  Overall Orientation Status: Within Normal Limits  Cognition  Overall Cognitive Status: WNL    Strength:    Strength: Within functional limits    Tone & Sensation:   Tone: Normal  Sensation: Intact    Range Of Motion:  AROM: Within functional limits       Functional Mobility:  Bed Mobility:     Bed Mobility Training  Bed Mobility Training: Yes  Overall Level of Assistance: Independent  Supine to Sit: Independent  Sit to Supine: Independent  Transfers:     Transfer Training  Transfer Training: Yes  Sit to Stand: Modified independent  Stand to Sit: Modified independent  Balance:     Balance  Sitting: Intact  Standing: Intact       Ambulation/Gait Training:    Gait  Overall Level of Assistance: Modified independent  Distance (ft): 10 Feet  Assistive Device: Crutches    Pain:  Pain level pre-treatment: 0/10   Pain level post-treatment: 0/10   Pain Intervention(s): Medication (see MAR); Rest, Ice, Repositioning  Response to intervention: Nurse notified, See doc flow    Activity Tolerance:    Fair +  Please refer to the flowsheet for vital signs taken during this treatment.     After treatment:   []         Patient left in no apparent distress sitting up in chair  [x]         Patient left in no apparent distress in bed  [x]         Call bell left within reach  [x]         Nursing notified  []         Caregiver present  []         Bed alarm activated  []         SCDs applied    COMMUNICATION/EDUCATION:   Patient Education  Education Given To: Patient  Education Provided: Role of Therapy  Education Method: Verbal  Barriers to Learning: None  Education Outcome: Verbalized understanding    Thank you for this referral.  Celso Palomo, PT  Minutes: 15      Eval Complexity:      Karyn Lee AM-PAC® Basic Mobility Inpatient Short Form (6-Clicks) Version 2    How much HELP from another person does the patient currently need    (If the patient hasn't done an activity recently, how much help from another person do you think he/she would need if he/she tried?)   Total (Total A or Dep)   A Lot  (Mod to Max A)   A Little (Sup or Min A)   None (Mod I to I)   Turning from your back to your side while in a flat bed without using bedrails? [] 1 [] 2 [] 3 [x] 4   2. Moving from lying on your back to sitting on the side of a flat bed without using bedrails? [] 1 [] 2 [] 3 [x] 4   3. Moving to and from a bed to a chair (including a wheelchair)? [] 1 [] 2 [] 3 [x] 4   4. Standing up from a chair using your arms (e.g., wheelchair, or bedside chair)? [] 1 [] 2 [] 3 [x] 4   5. Walking in hospital room? [] 1 [] 2 [] 3 [x] 4   6. Climbing 3-5 steps with a railing?+   [] 1 [] 2 [] 3 [x] 4   +If stair climbing cannot be assessed, skip item #6. Sum responses from items 1-5.

## 2023-03-03 NOTE — PROGRESS NOTES
[facility] - [facility] Note    Admit Date: 3/1/2023  Attending Cardiologist: Dr. Vasiliy Tamayo:     -Afib, chronic per pt report, dating back to 2013 when had PPM implanted  -S/p Deborah HARRELL Biotronik PPM 10/2013 EMILIANA TAYLOR [facility], Dr. Valorie Mtz)  -HFpEF, chronic, Echo 3/2/2023 with LVEF 55-60% with normal wall motion, normal LV size, RV with reduced systolic function  -History of \"mild heart attack\" in 2013, around time of PPM implantation, pt does not recall having any stress test/cardiac cath at that time  -HTN, uncontrolled, /109 on presentation 3/1/2023  -Morbid obesity     No Primary cardiologist; consult by Dr. Kim Clark:     -Continue IV Lasix, diuresing well on current regimen, renal indices stable. -Pacemaker changed to VVI yesterday afternoon. HR remains stable.    ----------------------------------------------  Atrial lead malfunction discussed with patient. Device now VVI. Continue IV Lasix for another day. No plans for revision this admission. Dispo planning. I saw, examined, and evaluated this patient and performed the substantive portion of the encounter for > 50% of the time including extensive history, physical exam, and medical decision making as discussed with patient and next-of-kin as needed. I personally reviewed the patient's labs, tests, vitals, orders, medications, updated history, and other providers assessments as well. I personally agree with the findings as stated and the plan as documented. Marcie Dean MD        Subjective:     No new complaints.      Objective:      Patient Vitals for the past 8 hrs:   Temp Pulse Resp BP SpO2   03/03/23 0816 98.1 °F (36.7 °C) 73 23 137/82 93 %   03/03/23 0400 -- 68 18 (!) 147/102 93 %   03/03/23 0355 -- -- -- -- (!) 84 %   03/03/23 0330 -- 86 13 (!) 150/90 (!) 82 %   03/03/23 0300 98 °F (36.7 °C) 69 16 (!) 143/89 90 %   03/03/23 0200 -- 78 18 126/89 91 %   03/03/23 0100 -- 78 17 (!) 148/83 90 % Patient Vitals for the past 96 hrs:   Weight   03/02/23 1441 (!) 558 lb (253.1 kg)   03/02/23 0230 (!) 558 lb (253.1 kg)   03/01/23 1825 (!) 558 lb (253.1 kg)       TELE: AFIB               Current Facility-Administered Medications   Medication Dose Route Frequency    furosemide (LASIX) injection 40 mg  40 mg IntraVENous BID    sacubitril-valsartan (ENTRESTO) 24-26 MG per tablet 1 tablet  1 tablet Oral BID    sodium chloride flush 0.9 % injection 5-40 mL  5-40 mL IntraVENous 2 times per day    sodium chloride flush 0.9 % injection 5-40 mL  5-40 mL IntraVENous PRN    0.9 % sodium chloride infusion   IntraVENous PRN    enoxaparin (LOVENOX) injection 60 mg  60 mg SubCUTAneous BID    ondansetron (ZOFRAN-ODT) disintegrating tablet 4 mg  4 mg Oral Q8H PRN    Or    ondansetron (ZOFRAN) injection 4 mg  4 mg IntraVENous Q6H PRN    polyethylene glycol (GLYCOLAX) packet 17 g  17 g Oral Daily PRN    acetaminophen (TYLENOL) tablet 650 mg  650 mg Oral Q6H PRN    Or    acetaminophen (TYLENOL) suppository 650 mg  650 mg Rectal Q6H PRN    famotidine (PEPCID) tablet 20 mg  20 mg Oral BID    metoprolol tartrate (LOPRESSOR) tablet 50 mg  50 mg Oral BID         Intake/Output Summary (Last 24 hours) at 3/3/2023 0847  Last data filed at 3/3/2023 0630  Gross per 24 hour   Intake 1020 ml   Output 6350 ml   Net -5330 ml       Physical Exam:  General:  alert, appears stated age, cooperative, and no distress  Neck:  supple  Lungs:  clear to auscultation bilaterally  Heart:  irregularly irregular rhythm  Abdomen:  abdomen is soft without significant tenderness, masses, organomegaly or guarding  Extremities:  atraumatic, + edema (improving)    Visit Vitals  /82   Pulse 73   Temp 98.1 °F (36.7 °C) (Oral)   Resp 23   Ht 6' 1\" (1.854 m)   Wt (!) 558 lb (253.1 kg)   SpO2 93%   BMI 73.62 kg/m²       Data Review:     Labs: Results:       Chemistry Recent Labs     03/01/23  1830 03/02/23  0553 03/03/23  0603    140 138   K 4.1 3.9 3.6  107 102   CO2 30 28 31   BUN 17 15 15   CREATININE 1.12 1.08 1.16   MG  --  2.1 2.2   GLOB 3.6 3.4 3.6      CBC w/Diff Recent Labs     03/01/23  1830 03/02/23  0553 03/03/23  0603   WBC 7.6 5.8 5.7   RBC 5.22 4.85 5.00   HGB 15.5 14.3 14.7   HCT 47.3 42.8 44.7    169 159      Cardiac Enzymes Lab Results   Component Value Date/Time    TROPHS 31 03/02/2023 05:53 AM    TROPHS 24 03/01/2023 06:30 PM      Lipid Panel Lab Results   Component Value Date/Time    CHOL 145 03/01/2023 10:56 PM    HDL 46 03/01/2023 10:56 PM      Liver Enzymes Lab Results   Component Value Date    ALT 28 03/03/2023    AST 24 03/03/2023    ALKPHOS 68 03/03/2023    BILITOT 1.0 03/03/2023        Signed By: Charissa Khan PA-C     March 3, 2023

## 2023-03-03 NOTE — PROGRESS NOTES
Progress Note    Patient: Yovani Morel MRN: [id number]  CSN: [id number]    YOB: 1965  Age: 61 y.o. Sex: male    DOA: 3/1/2023 LOS:  LOS: 2 days                    Subjective: The patient reports that he is doing well today with no acute concerns. He remains in rate controlled Afib on the monitor. He denies CP, palpitations, dizziness, N/V. Blood pressure improving. Still on IV lasix. Diuresing well. Per Cardiology, PPM interrogation revealed high impendence to atrial lead. Pacemaker subsequently changed to VVI pacing. Discussed plan to initiate oral anticoagulation with patient, per cardiology recommendation. HPI:  Yovani Morel is a 61 y.o. male who has past medical history of hypertension, pacemaker for unclear reason, morbid obesity, sent to the ER from the clinic by myself, his PCP who recently reestablished care after 3 years of not being seen by his providers. EKG done in the clinic which showed A-fib with RVR with heart rates 1 13-1 20. In the clinic, patient was given 10 mg of Bystolic. Upon arrival to the emergency room his heart rate had improved slightly but was still tachycardic, no further interventions were done and heart rate improved, currently rate controlled. Patient has no acute complaints other than he has poor exercise tolerance. Is unable to walk more than a few steps before becoming short of breath. Has difficulty with mobility and simple tasks due to his weight. Of note, weight has been stable since his last clinic visit in 2019. Presented to the clinic with complaints of bilateral lower leg swelling and pain. Says that the leg swelling is always there but is worse at this time than it has been in the past.  Denies any fevers, chills, headache, nausea, vomiting, chest pain, dizziness, or loss of consciousness. Patient was reluctant to come to the emergency room however came by ambulance.     JOSELYN: 3/2/23:  Left Ventricle Normal left ventricular systolic function with a visually estimated EF of 55 - 60%. Left ventricle size is normal. Severe septal thickening. Moderate posterior thickening. Normal wall motion. Left Atrium Not well visualized. Interatrial Septum Interatrial septum was not well visualized. Right Ventricle Not well visualized. Reduced systolic function. TAPSE is abnormal. TAPSE is 1.6 cm. RV Peak S' is 10 cm/s. Right Atrium Not well visualized. Aortic Valve Valve structure is normal. No regurgitation. No stenosis. Mitral Valve Valve structure is normal. No regurgitation. No stenosis noted. Tricuspid Valve Valve structure is normal. No regurgitation. No stenosis noted. Unable to assess RVSP due to inadequate or insignificant tricuspid regurgitation. Pulmonic Valve Valve structure is normal. No regurgitation. No stenosis noted. Pulmonary Artery Pulmonary hypertension not present. Aorta Dilated aortic root. Ao root diameter is 3.7 cm. Dilated ascending aorta. Ao ascending diameter is 4.1 cm. IVC/Hepatic Veins IVC diameter is greater than 21 mm and decreases greater than 50% during inspiration; therefore the estimated right atrial pressure is intermediate (~8 mmHg). IVC is dilated. Pericardium The pericardium is normal. No pericardial effusion. Chief Complaint:   Chief Complaint   Patient presents with    Irregular Heart Beat     Patient was sent from PMD after he was found in afib when they did EKG, has not seen a dr. In years pt has PM for about 10 yrs        Review of systems  General: No fevers or chills. Cardiovascular: KASPER, +BLE edema. No chest pain or pressure. No palpitations. Pulmonary: No cough or wheeze. Gastrointestinal: No abdominal pain, nausea, vomiting or diarrhea. Genitourinary: No urinary frequency, urgency, hesitancy or dysuria. Musculoskeletal: No joint or muscle pain, no back pain, no recent trauma. Neurologic: No headache, numbness, tingling or weakness.      Objective:     Physical Exam:  Visit Vitals  /82   Pulse 73   Temp 98.1 °F (36.7 °C) (Oral)   Resp 23   Ht 6' 1\" (1.854 m)   Wt (!) 558 lb (253.1 kg)   SpO2 93%   BMI 73.62 kg/m²        General:         Morbidly obese, alert, cooperative, no acute distress    HEENT: NC, Atraumatic. PERRLA, anicteric sclerae. Lungs: CTA Bilaterally. No Wheezing/Rhonchi/Rales. Heart:  Irregularly irregular  rhythm,  No murmur, No Rubs, No Gallops  Abdomen: Obese, Soft, Non distended, Non tender. +Bowel sounds, no HSM  Extremities: Chronic venous changes BLE: healing ulcer L pannus, +1 BLE pitting edema   Psych:   Not anxious or agitated. Neurologic:  CN 2-12 grossly intact, Alert and oriented X 3. No acute neurological  Deficits,     Intake and Output:  Current Shift:  03/03 0701 - 03/03 1900  In: -   Out: 380 [Urine:380]  Last three shifts:  03/01 1901 - 03/03 0700  In: 1020 [P.O.:1020]  Out: 6550 [Urine:6550]    Labs: Results:       Chemistry Recent Labs     03/01/23  1830 03/02/23  0553 03/03/23  0603    140 138   K 4.1 3.9 3.6    107 102   CO2 30 28 31   BUN 17 15 15   GLOB 3.6 3.4 3.6      CBC w/Diff Recent Labs     03/01/23  1830 03/02/23  0553 03/03/23  0603   WBC 7.6 5.8 5.7   RBC 5.22 4.85 5.00   HGB 15.5 14.3 14.7   HCT 47.3 42.8 44.7    169 159      Cardiac Enzymes No results for input(s): CPK, VJ in the last 72 hours. Invalid input(s): CKRMB, CKND1, TROIP   Coagulation No results for input(s): INR, APTT in the last 72 hours. Invalid input(s): PTP    Lipid Panel Lab Results   Component Value Date/Time    CHOL 145 03/01/2023 10:56 PM    HDL 46 03/01/2023 10:56 PM      BNP Invalid input(s): BNPP   Liver Enzymes No results for input(s): TP, ALB in the last 72 hours.     Invalid input(s): TBIL, AP, SGOT, GPT, DBIL   Thyroid Studies No results found for: T4, TSH       Procedures/imaging: see electronic medical records for all procedures/Xrays and details which were not copied into this note but were reviewed prior to creation of Plan    Medications:   Current Facility-Administered Medications   Medication Dose Route Frequency    furosemide (LASIX) injection 40 mg  40 mg IntraVENous BID    sacubitril-valsartan (ENTRESTO) 24-26 MG per tablet 1 tablet  1 tablet Oral BID    sodium chloride flush 0.9 % injection 5-40 mL  5-40 mL IntraVENous 2 times per day    sodium chloride flush 0.9 % injection 5-40 mL  5-40 mL IntraVENous PRN    0.9 % sodium chloride infusion   IntraVENous PRN    enoxaparin (LOVENOX) injection 60 mg  60 mg SubCUTAneous BID    ondansetron (ZOFRAN-ODT) disintegrating tablet 4 mg  4 mg Oral Q8H PRN    Or    ondansetron (ZOFRAN) injection 4 mg  4 mg IntraVENous Q6H PRN    polyethylene glycol (GLYCOLAX) packet 17 g  17 g Oral Daily PRN    acetaminophen (TYLENOL) tablet 650 mg  650 mg Oral Q6H PRN    Or    acetaminophen (TYLENOL) suppository 650 mg  650 mg Rectal Q6H PRN    famotidine (PEPCID) tablet 20 mg  20 mg Oral BID    metoprolol tartrate (LOPRESSOR) tablet 50 mg  50 mg Oral BID       Assessment/Plan     Principal Problem:    Chronic atrial fibrillation (HCC)  Active Problems:    Chronic a-fib (HCC)    Acute on chronic heart failure with preserved ejection fraction (HCC)    Hypertension  Resolved Problems:    * No resolved hospital problems.  *    Atrial fibrillation with RVR:  History of pacemaker:  Lower extremity edema:  Hypertension:  Not previously on any medications  New diagnosis of A-fib, currently rate controlled  Trop: 24, 31  3/1 D-Dimer 0.37  3/2 TSH 1.71  3/2 Echo EF 58%, Severe septal thickening, Dilated aortic root (3.7 cm), ascending aorta (4.1 cm)  3/2 PPM interrogation: high impendence to atrial lead  PPM changed to VVI pacing  Metoprolol 50 mg PO BID  Entresto 24-26 mg PO BID  Lasix 40 mg IV daily  Lovenox 60 mg SQ BID  Plan for oral anticoagulation, per cardiology  Consider duplex ultrasound of bilateral lower extremities  Monitor fluid status, fluid restrication 2L/day, salt intake <2g per day  Will need outpatient sleep study for suspected SHELBY. Obesity, Morbid: We will continue to watch /treat outpatient   Diet and lifestyle counselling  PT OT  Will need outpatient sleep study for suspected SHELBY. NEEDS BARIATRIC CRUTCHES ON DISCHARGE FOR SAFE [employer] Sensor  3/3/2023 9:53 AM      The patient was seen and examined independently, I agree with the student note  Review note and appropriate changes was made. Discussed with student my assessment and plan as outlined in the above note     Cardiology following, started on Entresto, continue IV lasix 40mg BID; currently on lovenox. Yet to decide on anticoagulation, NOAC vs warfarin. Continue metoprolol 50mg BID. Monitor blood pressure  AM labs: CBC, CMP, Mg  Will need outpatient sleep study for suspected SHELBY.        Vish Londono MD  3/3/2023

## 2023-03-03 NOTE — CARE COORDINATION
PTOT recommending bariatric crutches. Met with pt. He has crutches in his room and declined for CM to order him crutches. He stated the crutches he uses are fine with him.             Eleonora Bagley, APOLINARN RN  Care Management

## 2023-03-03 NOTE — PROGRESS NOTES
1456 Bedside shift change report given to [address] (oncoming nurse) by Panda Matos RN (offgoing nurse). Report included the following information Nurse Handoff Report, Index, ED Encounter Summary, ED SBAR, Adult Overview, Intake/Output, MAR, Recent Results, Med Rec Status, and Cardiac Rhythm Paced/afib .

## 2023-03-03 NOTE — PROGRESS NOTES
Advance Care Planning     Advance Care Planning Inpatient Note  [facility] Department    Today's Date: 3/3/2023  Unit: [facility] CV STEPDOWN    Received request from rounding and admission screening. Upon review of chart and communication with care team, patient's decision making abilities are not in question. . Patient was/were present in the room during visit. Goals of ACP Conversation:  Discuss advance care planning documents    Health Care Decision Makers:       Primary Decision Maker: Olivia Crawford - [phone]    Secondary Decision Maker: Vincent Belcher - Brother/Sister - [phone]  Summary:  Verified Documents  Completed [facility]  Documented Next of Kin, per patient report    Advance Care Planning Documents (Patient Wishes):  Healthcare Power of /Advance Directive Appointment of Health Care Agent  Living Will/Advance Directive     Assessment:  Patient's ability to make healthcare decisions is not in question at the time this ACP document was completed. Interventions:  Provided education on documents for clarity and greater understanding  Discussed and provided education on state decision maker hierarchy  Assisted in the completion of documents according to patient's wishes at this time  Encouraged ongoing ACP conversation with future decision makers and loved ones    Care Preferences Communicated:   No    Outcomes/Plan:  ACP Discussion: Completed  New advance directive completed. Returned original document(s) to patient, as well as copies for distribution to appointed agents  Copy of advance directive given to staff to scan into medical record. Routed ACP note to attending provider or other IDT member.   Teach Back Method used to verify the patient's and/or Healthcare Decision Maker's understanding of key information in the advance directive documents    Electronically signed by Svetlana Gregorio, [email or web address] on 3/3/2023 at 11:31 AM

## 2023-03-03 NOTE — PROGRESS NOTES
OCCUPATIONAL THERAPY EVALUATION/DISCHARGE    Patient: Tyson Tomlinson (57 y.o. male)  Date: 3/3/2023  Primary Diagnosis: Atrial fibrillation (HCC) [I48.91]  New onset atrial fibrillation (HCC) [I48.91]  New onset a-fib (HCC) [I48.91]  Uncontrolled hypertension [I10]  Morbid obesity with BMI of 70 and over, adult (HCC) [E66.01, Z68.45]  Atrial fibrillation, unspecified type (HCC) [I48.91]       Precautions:General Precautions, Fall Risk  PLOF: Pt lives with spouse, Mod Ind for ADLs and functional mobility with crutches. Pt uses AE for LB ADLs and toileting.    ASSESSMENT AND RECOMMENDATIONS:    Based on the objective data below, patient presents with ability to perform basic ADLs and functional transfers at baseline level of function. Pt performed/simulated UB ADLs with Mod Ind for seated and std aspects. Pt demonstrates good safety awareness during all standing tasks and functional mobility with crutches, which he uses at baseline. Pt reports using sock aid, long handled shoe horn, long handled brush, and reacher for LB ADLs. Pt has toilet tongues for toileting. Pt verbalized appropriate techniques for use of equipment. Pt lives with supportive spouse available to assist as needed.    Maximum therapeutic gains met at current level of care and patient will be discharged from occupational therapy at this time.    Further Equipment Recommendations for Discharge: bariatric crutches    Discharge Recommendations: Home independently    AMPAC: At this time and based on an AM-PAC score of 21/24, no further OT is recommended upon discharge due to (i.e. patient at baseline functional status…etc…).  Recommend patient returns to prior setting with prior services.     This AMPAC score should be considered in conjunction with interdisciplinary team recommendations to determine the most appropriate discharge setting. Patient's social support, diagnosis, medical stability, and prior level of function should also be taken into  consideration. SUBJECTIVE:   Patient stated I am good    OBJECTIVE DATA SUMMARY:   History reviewed. No pertinent past medical history. No past surgical history on file. Home Situation:   Social/Functional History  Lives With: Spouse  Type of Home: House  Home Layout: One level  Home Access: Stairs to enter with rails  Entrance Stairs - Number of Steps: 2  ADL Assistance: Independent  Ambulation Assistance: Independent  []  Right hand dominant   []  Left hand dominant    Cognitive/Behavioral Status:  Orientation  Overall Orientation Status: Within Normal Limits  Orientation Level: Oriented X4  Cognition  Overall Cognitive Status: WNL    Skin: visible skin intact  Edema: none noted    Vision/Perceptual:    Vision  Vision: Within Functional Limits       Coordination: BUE  Coordination: Generally decreased, functional     Coordination: Within functional limits      Balance:     Balance  Sitting: Intact  Standing: Intact    Strength: BUE  Strength: Within functional limits    Tone & Sensation: BUE  Tone: Normal  Sensation: Intact    Range of Motion: BUE  AROM: Generally decreased, functional   Functional Mobility and Transfers for ADLs:  Bed Mobility:     Bed Mobility Training  Bed Mobility Training: Yes  Overall Level of Assistance: Independent  Supine to Sit: Independent  Sit to Supine: Independent  Transfers:   Transfer Training  Transfer Training: Yes  Sit to Stand: Modified independent  Stand to Sit: Modified independent    ADL Assessment:   Feeding: Independent  Grooming: Modified independent   UE Bathing: Modified independent   LE Bathing: Supervision  UE Dressing: Modified independent   LE Dressing: Supervision; Adaptive equipment  Toileting: Modified independent     Pain:  Pain level pre-treatment: 0/10   Pain level post-treatment: 0/10     Activity Tolerance:   Activity Tolerance: Patient Tolerated treatment well  Please refer to the flowsheet for vital signs taken during this treatment.     After treatment:   [] Patient left in no apparent distress sitting up in chair  [x] Patient left in no apparent distress in bed  [x] Call bell left within reach  [x] Nursing notified  [] Caregiver present  [] Bed alarm activated    COMMUNICATION/EDUCATION:   Patient Education  Education Given To: Patient  Education Provided: Role of Therapy;Plan of Care  Education Method: Verbal;Teach Back  Barriers to Learning: None  Education Outcome: Demonstrated understanding    Thank you for this referral.  Christina Menon OTR/L  Minutes: 16    Eval Complexity: [facility] AM-PAC® Daily Activity Inpatient Short Form (6-Clicks)*    How much HELP from another person does the patient currently need    (If the patient hasn't done an activity recently, how much help from another person do you think he/she would need if he/she tried?)   Total (Total A or Dep)   A Lot  (Mod to Max A)   A Little (Sup or Min A)   None (Mod I to I)   Putting on and taking off regular lower body clothing? [] 1 [] 2 [x] 3 [] 4   2. Bathing (including washing, rinsing,      drying)? [] 1 [] 2 [x] 3 [] 4   3. Toileting, which includes using toilet, bedpan or urinal?   [] 1 [] 2 [x] 3 [] 4   4. Putting on and taking off regular upper body clothing? [] 1 [] 2 [] 3 [x] 4   5. Taking care of personal grooming such as brushing teeth? [] 1 [] 2 [] 3 [x] 4   6. Eating meals? [] 1 [] 2 [] 3 [x] 4       At this time and based on an AM-PAC score of 21/24, no further OT is recommended upon discharge due to (i.e. patient at baseline functional statusetc). Recommend patient returns to prior setting with prior services.

## 2023-03-03 NOTE — CARE COORDINATION
Case Management Assessment  Initial Evaluation    Date/Time of Evaluation: 3/3/2023 4:04 PM  Assessment Completed by: Dom Cordero RN    If patient is discharged prior to next notation, then this note serves as note for discharge by case management. Patient Name: Bob Allen                   YOB: 1965  Diagnosis: Atrial fibrillation ([address] [I48.91]  New onset atrial fibrillation ([address] [I48.91]  New onset a-fib ([address] [I48.91]  Uncontrolled hypertension [I10]  Morbid obesity with BMI of 70 and over, adult ([facility] [E66.01, Z68.45]  Atrial fibrillation, unspecified type ([address] [I48.91]                   Date / Time: 3/1/2023  6:20 PM    Patient Admission Status: Inpatient   Readmission Risk (Low < 19, Mod (19-27), High > 27): Readmission Risk Score: 10.2    Current PCP: None None  PCP verified by CM? (P) Yes    Chart Reviewed: Yes      History Provided by: (P) Patient  Patient Orientation: (P) Alert and Oriented, Person, Place, Situation    Patient Cognition: (P) Alert    Hospitalization in the last 30 days (Readmission):  Yes    If yes, Readmission Assessment in CM Navigator will be completed.     Advance Directives:      Code Status: Full Code   Patient's Primary Decision Maker is: (P) Legal Next of Kin    Primary Decision MakerRenate Hector - Spouse - [phone]    Secondary Decision Maker: Maggi Cortez - Brother/Sister - [phone]    Discharge Planning:    Patient lives with: (P) Spouse/Significant Other Type of Home: House  Primary Care Giver: (P) Self  Patient Support Systems include: (P) Spouse/Significant Other, Children   Current Financial resources: (P) Medicare  Current community resources:    Current services prior to admission: None            Current DME:              Type of Home Care services:  None    ADLS  Prior functional level: (P) Independent in ADLs/IADLs  Current functional level: (P) Independent in ADLs/IADLs    PT AM-PAC:   /24  OT AM-PAC:   /24    Family can provide assistance at DC: (P) Yes  Would you like Case Management to discuss the discharge plan with any other family members/significant others, and if so, who? Plans to Return to Present Housing: (P) Yes  Other Identified Issues/Barriers to RETURNING to current housing: none  Potential Assistance needed at discharge:              Potential DME:    Patient expects to discharge to: [address] [place] [address] for transportation at discharge: (P) Family    Financial    Payor: MEDICARE / Plan: MEDICARE PART A AND B / Product Type: *No Product type* /     Does insurance require precert for SNF: No    Potential assistance Purchasing Medications: Yes  Meds-to-Beds request:      No Pharmacies Listed    Notes:    Factors facilitating achievement of predicted outcomes: Family support, Cooperative, and Pleasant    Barriers to discharge: none    Additional Case Management Notes: pt lives with his wife, independent and has crutches    The Plan for Transition of Care is related to the following treatment goals of Atrial fibrillation ([address] [I48.91]  New onset atrial fibrillation ([address] [I48.91]  New onset a-fib ([address] [I48.91]  Uncontrolled hypertension [I10]  Morbid obesity with BMI of 70 and over, adult ([address] [E66.01, Z68.45]  Atrial fibrillation, unspecified type ([address] [S55.60]    IF APPLICABLE: The Patient and/or patient representative Bryanna Broussard and his family were provided with a choice of provider and agrees with the discharge plan. Freedom of choice list with basic dialogue that supports the patient's individualized plan of care/goals and shares the quality data associated with the providers was provided to:     Patient Representative Name:       The Patient and/or Patient Representative Agree with the Discharge Plan?       Marely Kong RN  Case Management Department                 03/03/23 3014   Service Assessment   Patient Orientation Alert and Oriented;Person;Place;Situation   Cognition Alert   History Provided By Patient   Primary Caregiver Self   Support Systems Spouse/Significant Other;Children   Patient's Healthcare Decision Maker is: Legal Next of Kin   PCP Verified by CM Yes   Prior Functional Level Independent in ADLs/IADLs   Current Functional Level Independent in ADLs/IADLs   Can patient return to prior living arrangement Yes   Ability to make needs known: Good   Family able to assist with home care needs: Yes   Financial Resources Medicare   Social/Functional History   Lives With Spouse   Type of [address] Two level   Home Access Stairs to enter with rails   Entrance Stairs - Number of Steps 2   [address] Help From Family   ADL Assistance Independent   Ambulation Assistance Independent   Transfer Assistance Independent   Occupation On disability   Discharge [address] [facility] Discharge   Transition of [address] ([address] [facility] [address] [facility] None   Mode of Transport at Discharge Other (see comment)  (family)   Confirm Follow Up Transport Family

## 2023-03-03 NOTE — PROGRESS NOTES
conducted an initial consultation and Spiritual Assessment for Beck Akhtar, who is a 61 y. o.,male. Patients Primary Language is: [place]. According to the patients EMR Alevism Affiliation is: Zoroastrian. The reason the Patient came to the hospital is:   Patient Active Problem List    Diagnosis Date Noted    Acute on chronic heart failure with preserved ejection fraction ([facility] 03/02/2023    Hypertension 03/02/2023    Chronic atrial fibrillation ([facility] 03/01/2023    Chronic a-fib ([facility] 03/01/2023        The  provided the following Interventions:  Initiated a relationship of care and support. Explored issues of abdi, belief, spirituality and Christian/ritual needs while hospitalized. Listened empathically. Provided education on Advance Medical Directives. Provided information about Spiritual Care Services. Offered assurance of continued prayers on patient's behalf. Chart reviewed. Assistance in completing AMD/ACP documents. Primary Decision Maker: Brain Jeffery - Spouse - [phone]    Secondary Decision Maker: Chester Talley - Brother/Sister - [phone]  Summary:  Verified Documents  Completed [facility]  Documented Next of Kin, per patient report  The following outcomes where achieved:  Patient shared limited information about both his medical narrative and spiritual journey/beliefs.  confirmed Patient's Zoroastrian  Alevism Affiliation but no spiritual affiliation. Patient processed feeling about current hospitalization. Patient expressed gratitude for 's visit. Assessment:  Patient does not have any Christian/cultural needs that will affect patients preferences in health care. There are no spiritual or Christian issues which require intervention at this time. Plan:  Chaplains will continue to follow and will provide pastoral care on an as needed/requested basis.    recommends bedside caregivers page  on duty if patient shows signs of acute spiritual or emotional distress.     Josseline Santos [phone]

## 2023-03-04 LAB
ANION GAP SERPL CALC-SCNC: 6 MMOL/L (ref 3–18)
BASOPHILS # BLD: 0.1 K/UL (ref 0–0.1)
BASOPHILS NFR BLD: 1 % (ref 0–2)
BUN SERPL-MCNC: 17 MG/DL (ref 7–18)
BUN/CREAT SERPL: 14 (ref 12–20)
CALCIUM SERPL-MCNC: 9 MG/DL (ref 8.5–10.1)
CHLORIDE SERPL-SCNC: 100 MMOL/L (ref 100–111)
CO2 SERPL-SCNC: 31 MMOL/L (ref 21–32)
CREAT SERPL-MCNC: 1.21 MG/DL (ref 0.6–1.3)
DIFFERENTIAL METHOD BLD: NORMAL
EOSINOPHIL # BLD: 0.1 K/UL (ref 0–0.4)
EOSINOPHIL NFR BLD: 1 % (ref 0–5)
ERYTHROCYTE [DISTWIDTH] IN BLOOD BY AUTOMATED COUNT: 13 % (ref 11.6–14.5)
GLUCOSE SERPL-MCNC: 104 MG/DL (ref 74–99)
HCT VFR BLD AUTO: 46.6 % (ref 36–48)
HGB BLD-MCNC: 15.5 G/DL (ref 13–16)
IMM GRANULOCYTES # BLD AUTO: 0 K/UL (ref 0–0.04)
IMM GRANULOCYTES NFR BLD AUTO: 0 % (ref 0–0.5)
LYMPHOCYTES # BLD: 1.9 K/UL (ref 0.9–3.6)
LYMPHOCYTES NFR BLD: 29 % (ref 21–52)
MAGNESIUM SERPL-MCNC: 2.1 MG/DL (ref 1.6–2.6)
MCH RBC QN AUTO: 29.6 PG (ref 24–34)
MCHC RBC AUTO-ENTMCNC: 33.3 G/DL (ref 31–37)
MCV RBC AUTO: 88.9 FL (ref 78–100)
MONOCYTES # BLD: 0.6 K/UL (ref 0.05–1.2)
MONOCYTES NFR BLD: 9 % (ref 3–10)
NEUTS SEG # BLD: 4 K/UL (ref 1.8–8)
NEUTS SEG NFR BLD: 60 % (ref 40–73)
NRBC # BLD: 0 K/UL (ref 0–0.01)
NRBC BLD-RTO: 0 PER 100 WBC
PLATELET # BLD AUTO: 167 K/UL (ref 135–420)
PMV BLD AUTO: 11.3 FL (ref 9.2–11.8)
POTASSIUM SERPL-SCNC: 3.2 MMOL/L (ref 3.5–5.5)
RBC # BLD AUTO: 5.24 M/UL (ref 4.35–5.65)
SODIUM SERPL-SCNC: 137 MMOL/L (ref 136–145)
WBC # BLD AUTO: 6.7 K/UL (ref 4.6–13.2)

## 2023-03-04 PROCEDURE — 2700000000 HC OXYGEN THERAPY PER DAY

## 2023-03-04 PROCEDURE — 6370000000 HC RX 637 (ALT 250 FOR IP): Performed by: STUDENT IN AN ORGANIZED HEALTH CARE EDUCATION/TRAINING PROGRAM

## 2023-03-04 PROCEDURE — 99232 SBSQ HOSP IP/OBS MODERATE 35: CPT | Performed by: INTERNAL MEDICINE

## 2023-03-04 PROCEDURE — 2140000001 HC CVICU INTERMEDIATE R&B

## 2023-03-04 PROCEDURE — 6370000000 HC RX 637 (ALT 250 FOR IP): Performed by: INTERNAL MEDICINE

## 2023-03-04 PROCEDURE — 94761 N-INVAS EAR/PLS OXIMETRY MLT: CPT

## 2023-03-04 PROCEDURE — 6360000002 HC RX W HCPCS: Performed by: PHYSICIAN ASSISTANT

## 2023-03-04 PROCEDURE — 36415 COLL VENOUS BLD VENIPUNCTURE: CPT

## 2023-03-04 PROCEDURE — 83735 ASSAY OF MAGNESIUM: CPT

## 2023-03-04 PROCEDURE — 2580000003 HC RX 258: Performed by: STUDENT IN AN ORGANIZED HEALTH CARE EDUCATION/TRAINING PROGRAM

## 2023-03-04 PROCEDURE — 85025 COMPLETE CBC W/AUTO DIFF WBC: CPT

## 2023-03-04 PROCEDURE — 6360000002 HC RX W HCPCS: Performed by: STUDENT IN AN ORGANIZED HEALTH CARE EDUCATION/TRAINING PROGRAM

## 2023-03-04 PROCEDURE — 6370000000 HC RX 637 (ALT 250 FOR IP): Performed by: PHYSICIAN ASSISTANT

## 2023-03-04 PROCEDURE — 80048 BASIC METABOLIC PNL TOTAL CA: CPT

## 2023-03-04 RX ORDER — FUROSEMIDE 40 MG/1
40 TABLET ORAL DAILY
Status: DISCONTINUED | OUTPATIENT
Start: 2023-03-04 | End: 2023-03-05 | Stop reason: HOSPADM

## 2023-03-04 RX ORDER — POTASSIUM CHLORIDE 20 MEQ/1
40 TABLET, EXTENDED RELEASE ORAL ONCE
Status: COMPLETED | OUTPATIENT
Start: 2023-03-04 | End: 2023-03-04

## 2023-03-04 RX ADMIN — METOPROLOL TARTRATE 50 MG: 50 TABLET, FILM COATED ORAL at 08:14

## 2023-03-04 RX ADMIN — FAMOTIDINE 20 MG: 20 TABLET ORAL at 20:39

## 2023-03-04 RX ADMIN — SODIUM CHLORIDE, PRESERVATIVE FREE 10 ML: 5 INJECTION INTRAVENOUS at 08:14

## 2023-03-04 RX ADMIN — METOPROLOL TARTRATE 50 MG: 50 TABLET, FILM COATED ORAL at 20:39

## 2023-03-04 RX ADMIN — SODIUM CHLORIDE, PRESERVATIVE FREE 10 ML: 5 INJECTION INTRAVENOUS at 20:40

## 2023-03-04 RX ADMIN — SACUBITRIL AND VALSARTAN 1 TABLET: 24; 26 TABLET, FILM COATED ORAL at 20:39

## 2023-03-04 RX ADMIN — FUROSEMIDE 40 MG: 40 TABLET ORAL at 17:02

## 2023-03-04 RX ADMIN — SACUBITRIL AND VALSARTAN 1 TABLET: 24; 26 TABLET, FILM COATED ORAL at 08:14

## 2023-03-04 RX ADMIN — FAMOTIDINE 20 MG: 20 TABLET ORAL at 08:14

## 2023-03-04 RX ADMIN — POTASSIUM CHLORIDE 40 MEQ: 1500 TABLET, EXTENDED RELEASE ORAL at 08:45

## 2023-03-04 RX ADMIN — ENOXAPARIN SODIUM 60 MG: 100 INJECTION SUBCUTANEOUS at 08:14

## 2023-03-04 RX ADMIN — FUROSEMIDE 40 MG: 10 INJECTION, SOLUTION INTRAMUSCULAR; INTRAVENOUS at 08:14

## 2023-03-04 RX ADMIN — ENOXAPARIN SODIUM 60 MG: 100 INJECTION SUBCUTANEOUS at 20:39

## 2023-03-04 ASSESSMENT — PAIN SCALES - GENERAL
PAINLEVEL_OUTOF10: 0

## 2023-03-04 NOTE — PROGRESS NOTES
0715 Bedside shift change report given to Chelly Isabel RN (oncoming nurse) by Andrew Turpin (offgoing nurse). Report included the following information Nurse Handoff Report, Index, ED Encounter Summary, ED SBAR, Adult Overview, Intake/Output, MAR, Recent Results, Med Rec Status, and Cardiac Rhythm Afib/Paced .

## 2023-03-04 NOTE — PROGRESS NOTES
Progress Note    Patient: Amber Angel MRN: [id number]  CSN: [id number]    YOB: 1965  Age: 61 y.o. Sex: male    DOA: 3/1/2023 LOS:  LOS: 3 days                    Subjective:   Doing well. No complaints. Leg swelling improving. No pain, SOB, chest pain, palpitations. Blood pressure improving. Still on IV lasix. Diuresing well. Was started on Entresto. Pacemaker interrogated. Per Cardiology, PPM interrogation revealed high impendence to atrial lead. Pacemaker subsequently changed to VVI pacing. Discussed plan to initiate oral anticoagulation with patient, per cardiology recommendation. HPI:  Amber Angel is a 61 y.o. male who has past medical history of hypertension, pacemaker for unclear reason, morbid obesity, sent to the ER from the clinic by myself, his PCP who recently reestablished care after 3 years of not being seen by his providers. EKG done in the clinic which showed A-fib with RVR with heart rates 1 13-1 20. In the clinic, patient was given 10 mg of Bystolic. Upon arrival to the emergency room his heart rate had improved slightly but was still tachycardic, no further interventions were done and heart rate improved, currently rate controlled. Patient has no acute complaints other than he has poor exercise tolerance. Is unable to walk more than a few steps before becoming short of breath. Has difficulty with mobility and simple tasks due to his weight. Of note, weight has been stable since his last clinic visit in 2019. Presented to the clinic with complaints of bilateral lower leg swelling and pain. Says that the leg swelling is always there but is worse at this time than it has been in the past.  Denies any fevers, chills, headache, nausea, vomiting, chest pain, dizziness, or loss of consciousness. Patient was reluctant to come to the emergency room however came by ambulance.     JOSELYN: 3/2/23:  Left Ventricle Normal left ventricular systolic function with a visually estimated EF of 55 - 60%. Left ventricle size is normal. Severe septal thickening. Moderate posterior thickening. Normal wall motion. Left Atrium Not well visualized. Interatrial Septum Interatrial septum was not well visualized. Right Ventricle Not well visualized. Reduced systolic function. TAPSE is abnormal. TAPSE is 1.6 cm. RV Peak S' is 10 cm/s. Right Atrium Not well visualized. Aortic Valve Valve structure is normal. No regurgitation. No stenosis. Mitral Valve Valve structure is normal. No regurgitation. No stenosis noted. Tricuspid Valve Valve structure is normal. No regurgitation. No stenosis noted. Unable to assess RVSP due to inadequate or insignificant tricuspid regurgitation. Pulmonic Valve Valve structure is normal. No regurgitation. No stenosis noted. Pulmonary Artery Pulmonary hypertension not present. Aorta Dilated aortic root. Ao root diameter is 3.7 cm. Dilated ascending aorta. Ao ascending diameter is 4.1 cm. IVC/Hepatic Veins IVC diameter is greater than 21 mm and decreases greater than 50% during inspiration; therefore the estimated right atrial pressure is intermediate (~8 mmHg). IVC is dilated. Pericardium The pericardium is normal. No pericardial effusion. Chief Complaint:   Chief Complaint   Patient presents with    Irregular Heart Beat     Patient was sent from PMD after he was found in afib when they did EKG, has not seen a dr. In years pt has PM for about 10 yrs        Review of systems  General: No fevers or chills. Cardiovascular: KASPER, +BLE edema. No chest pain or pressure. No palpitations. Pulmonary: No cough or wheeze. Gastrointestinal: No abdominal pain, nausea, vomiting or diarrhea. Genitourinary: No urinary frequency, urgency, hesitancy or dysuria. Musculoskeletal: No joint or muscle pain, no back pain, no recent trauma. Neurologic: No headache, numbness, tingling or weakness.      Objective:     Physical Exam:  Visit Vitals  /66 Pulse 73   Temp 97.8 °F (36.6 °C) (Oral)   Resp 16   Ht 6' 1\" (1.854 m)   Wt (!) 558 lb (253.1 kg)   SpO2 92%   BMI 73.62 kg/m²        General:         Morbidly obese, alert, cooperative, no acute distress    HEENT: NC, Atraumatic. PERRLA, anicteric sclerae. Lungs: CTA Bilaterally. No Wheezing/Rhonchi/Rales. Heart:  Irregularly irregular  rhythm,  No murmur, No Rubs, No Gallops  Abdomen: Obese, Soft, Non distended, Non tender. +Bowel sounds, no HSM  Extremities: Chronic venous changes BLE: healing ulcer L pannus, +1 BLE pitting edema   Psych:   Not anxious or agitated. Neurologic:  CN 2-12 grossly intact, Alert and oriented X 3. No acute neurological  Deficits,     Intake and Output:  Current Shift:  03/04 0701 - 03/04 1900  In: -   Out: 200 [Urine:200]  Last three shifts:  03/02 1901 - 03/04 0700  In: 240 [P.O.:240]  Out: 4880 [Urine:4880]    Labs: Results:       Chemistry Recent Labs     03/01/23  1830 03/02/23  0553 03/03/23  0603 03/04/23  0520    140 138 137   K 4.1 3.9 3.6 3.2*    107 102 100   CO2 30 28 31 31   BUN 17 15 15 17   GLOB 3.6 3.4 3.6  --       CBC w/Diff Recent Labs     03/02/23  0553 03/03/23  0603 03/04/23  0520   WBC 5.8 5.7 6.7   RBC 4.85 5.00 5.24   HGB 14.3 14.7 15.5   HCT 42.8 44.7 46.6    159 167      Cardiac Enzymes No results for input(s): CPK, VJ in the last 72 hours. Invalid input(s): CKRMB, CKND1, TROIP   Coagulation No results for input(s): INR, APTT in the last 72 hours. Invalid input(s): PTP    Lipid Panel Lab Results   Component Value Date/Time    CHOL 145 03/01/2023 10:56 PM    HDL 46 03/01/2023 10:56 PM      BNP Invalid input(s): BNPP   Liver Enzymes No results for input(s): TP, ALB in the last 72 hours.     Invalid input(s): TBIL, AP, SGOT, GPT, DBIL   Thyroid Studies No results found for: T4, TSH       Procedures/imaging: see electronic medical records for all procedures/Xrays and details which were not copied into this note but were reviewed prior to creation of Plan    Medications:   Current Facility-Administered Medications   Medication Dose Route Frequency    potassium chloride (KLOR-CON M) extended release tablet 40 mEq  40 mEq Oral Once    furosemide (LASIX) injection 40 mg  40 mg IntraVENous BID    sacubitril-valsartan (ENTRESTO) 24-26 MG per tablet 1 tablet  1 tablet Oral BID    sodium chloride flush 0.9 % injection 5-40 mL  5-40 mL IntraVENous 2 times per day    sodium chloride flush 0.9 % injection 5-40 mL  5-40 mL IntraVENous PRN    0.9 % sodium chloride infusion   IntraVENous PRN    enoxaparin (LOVENOX) injection 60 mg  60 mg SubCUTAneous BID    ondansetron (ZOFRAN-ODT) disintegrating tablet 4 mg  4 mg Oral Q8H PRN    Or    ondansetron (ZOFRAN) injection 4 mg  4 mg IntraVENous Q6H PRN    polyethylene glycol (GLYCOLAX) packet 17 g  17 g Oral Daily PRN    acetaminophen (TYLENOL) tablet 650 mg  650 mg Oral Q6H PRN    Or    acetaminophen (TYLENOL) suppository 650 mg  650 mg Rectal Q6H PRN    famotidine (PEPCID) tablet 20 mg  20 mg Oral BID    metoprolol tartrate (LOPRESSOR) tablet 50 mg  50 mg Oral BID       Assessment/Plan     Principal Problem:    Chronic atrial fibrillation (HCC)  Active Problems:    Chronic a-fib (HCC)    Acute on chronic heart failure with preserved ejection fraction (HCC)    Hypertension  Resolved Problems:    * No resolved hospital problems.  *    Atrial fibrillation with RVR:  History of pacemaker:  -S/p Deborah HARRELL Biotronik PPM 10/2013 [facility], Dr. Kendall Morning)  -HFpEF, chronic, Echo 3/2/2023 with LVEF 55-60% with normal wall motion, normal LV size, RV with reduced systolic function  Not previously on any medications  New diagnosis of A-fib, currently rate controlled  Trop: 24, 31  3/1 D-Dimer 0.37  3/2 TSH 1.71  3/2 Echo EF 58%, Severe septal thickening, Dilated aortic root (3.7 cm), ascending aorta (4.1 cm)  3/2 PPM interrogation: high impendence to atrial lead  PPM changed to VVI pacing  Metoprolol 50 mg PO BID  Entresto 24-26 mg PO BID  Lasix 40 mg IV daily -- transition to PO  Lovenox 60 mg SQ BID  Plan for oral anticoagulation, per cardiology  Consider duplex ultrasound of bilateral lower extremities  Monitor fluid status, fluid restrication 2L/day, salt intake <2g per day  Will need outpatient sleep study for suspected SHELBY.   Cardiology, Dr Mackey  - -will need outpatient follow up.      Hypertension:  Controlled on metoprolol, Entresto; will continue.     Obesity, Morbid:  We will continue to watch /treat outpatient   Diet and lifestyle counselling  PT OT  Will need outpatient sleep study for suspected SHELBY.   NEEDS BARIATRIC CRUTCHES ON DISCHARGE FOR SAFE AMBULATION        Balbir Stallworth MD  3/4/2023 8:39 AM      The patient was seen and examined independently, I agree with the student note  Review note and appropriate changes was made. Discussed with student my assessment and plan as outlined in the above note     Cardiology following, started on Entresto,  lasix 40mg BID transition to PO; currently on lovenox. Yet to decide on anticoagulation, NOAC vs warfarin. Continue metoprolol 50mg BID.   Monitor blood pressure  AM labs: CBC, CMP, Mg  Will need outpatient sleep study for suspected SHELBY.

## 2023-03-04 NOTE — PROGRESS NOTES
[facility] - Progress Note    Admit Date: 3/1/2023  Attending Cardiologist: Dr. Julian Beltre:     -Afib, chronic per pt report, dating back to 2013 when had PPM implanted  -S/p Deborah HARRELL Biotronik PPM 10/2013 [facility], Dr. Pastor Fitting)  PPM changed to VVI this admission. -HFpEF. Echo 3/2/2023 with LVEF 55-60% with normal wall motion, normal LV size, RV with reduced systolic function  -History of \"mild heart attack\" in 2013, around time of PPM implantation, pt does not recall having any stress test/cardiac cath at that time  -HTN, uncontrolled, /109 on presentation 3/1/2023. Improved. -Morbid obesity, weight loss advised. Atrial fibrillation CHADSVASC2 score stroke risk:   61 y.o.                                        <65 - 0   male                                         Male - 0  CHF hx                                           Yes - 1  HTN hx                                           Yes - 1  Stroke/TIA/Thromboembolism       No - 0  Vascular disease hx                       No - 0  Diabetes Mellitus                            No - 0   CHADSVASC2 score                    2      Annual Stroke Risk 2.2%- moderate-high         No Primary cardiologist; consult by Dr. Debo Tierney:     -Will transition to maintenance diuretics today. Continued on Entresto. -AF rates stable, continue BB. Discussed with patient regarding Coumadin vs NOAC for thromboembolic prophylaxis, he would like to have further discussions with Dr. Aidan Chavez prior to making a final decision.   -Recommend outpatient sleep study for SHELBY. -K+ replacement ordered.   -Continued on Entresto. -Will arrange for outpatient follow up with Dr. Maryann Carey.     -----------------------------------------  Patient remains in atrial fibrillation with controlled rates. No further intervention for atrial lead at this point. I discussed anticoagulation with Eliquis, Xarelto, Coumadin or aspirin.   He would like to think about his options. Ultimately I recommend Xarelto given his weight. He wants to think about his options. He switch to oral diuretics today. Disposition per primary team and he will follow-up with Dr. Daysi Danielle. I saw, examined, and evaluated this patient and performed the substantive portion of the encounter for > 50% of the time including extensive history, physical exam, and medical decision making as discussed with patient and next-of-kin as needed. I personally reviewed the patient's labs, tests, vitals, orders, medications, updated history, and other providers assessments as well. I personally agree with the findings as stated and the plan as documented. Brook Mark MD      Subjective:     No new complaints. Wants to go home.      Objective:      Patient Vitals for the past 8 hrs:   Temp Pulse Resp BP SpO2   03/04/23 0744 97.8 °F (36.6 °C) 73 16 117/66 92 %   03/04/23 0400 97.9 °F (36.6 °C) 66 17 109/71 96 %           Patient Vitals for the past 96 hrs:   Weight   03/02/23 1441 (!) 558 lb (253.1 kg)   03/02/23 0230 (!) 558 lb (253.1 kg)   03/01/23 1825 (!) 558 lb (253.1 kg)         TELE: AFIB               Current Facility-Administered Medications   Medication Dose Route Frequency    furosemide (LASIX) injection 40 mg  40 mg IntraVENous BID    sacubitril-valsartan (ENTRESTO) 24-26 MG per tablet 1 tablet  1 tablet Oral BID    sodium chloride flush 0.9 % injection 5-40 mL  5-40 mL IntraVENous 2 times per day    sodium chloride flush 0.9 % injection 5-40 mL  5-40 mL IntraVENous PRN    0.9 % sodium chloride infusion   IntraVENous PRN    enoxaparin (LOVENOX) injection 60 mg  60 mg SubCUTAneous BID    ondansetron (ZOFRAN-ODT) disintegrating tablet 4 mg  4 mg Oral Q8H PRN    Or    ondansetron (ZOFRAN) injection 4 mg  4 mg IntraVENous Q6H PRN    polyethylene glycol (GLYCOLAX) packet 17 g  17 g Oral Daily PRN    acetaminophen (TYLENOL) tablet 650 mg  650 mg Oral Q6H PRN    Or    acetaminophen (TYLENOL) suppository 650 mg  650 mg Rectal Q6H PRN    famotidine (PEPCID) tablet 20 mg  20 mg Oral BID    metoprolol tartrate (LOPRESSOR) tablet 50 mg  50 mg Oral BID         Intake/Output Summary (Last 24 hours) at 3/4/2023 0831  Last data filed at 3/4/2023 0745  Gross per 24 hour   Intake 120 ml   Output 3880 ml   Net -3760 ml         Physical Exam:  General:  alert, appears stated age, cooperative, and no distress  Neck:  supple  Lungs:  clear to auscultation bilaterally  Heart:  irregularly irregular rhythm  Abdomen:  abdomen is soft without significant tenderness, masses, organomegaly or guarding  Extremities:  atraumatic, + edema (improving)    Visit Vitals  /66   Pulse 73   Temp 97.8 °F (36.6 °C) (Oral)   Resp 16   Ht 6' 1\" (1.854 m)   Wt (!) 558 lb (253.1 kg)   SpO2 92%   BMI 73.62 kg/m²       Data Review:     Labs: Results:       Chemistry Recent Labs     03/01/23  1830 03/02/23  0553 03/03/23  0603 03/04/23  0520    140 138 137   K 4.1 3.9 3.6 3.2*    107 102 100   CO2 30 28 31 31   BUN 17 15 15 17   CREATININE 1.12 1.08 1.16 1.21   MG  --  2.1 2.2 2.1   GLOB 3.6 3.4 3.6  --         CBC w/Diff Recent Labs     03/02/23  0553 03/03/23  0603 03/04/23  0520   WBC 5.8 5.7 6.7   RBC 4.85 5.00 5.24   HGB 14.3 14.7 15.5   HCT 42.8 44.7 46.6    159 167        Cardiac Enzymes Lab Results   Component Value Date/Time    TROPHS 31 03/02/2023 05:53 AM    TROPHS 24 03/01/2023 06:30 PM      Lipid Panel Lab Results   Component Value Date/Time    CHOL 145 03/01/2023 10:56 PM    HDL 46 03/01/2023 10:56 PM      Liver Enzymes Lab Results   Component Value Date    ALT 28 03/03/2023    AST 24 03/03/2023    ALKPHOS 68 03/03/2023    BILITOT 1.0 03/03/2023        Signed By: Ludwig Rothman PA-C     March 4, 2023

## 2023-03-04 NOTE — PROGRESS NOTES
1930: Bedside shift change report given to ALBA Garcia (oncoming nurse) by Eric Krishnamurthy (offgoing nurse). Report included the following information SBAR, Kardex, Intake/Output, MAR, Recent Results, and Cardiac Rhythm A-fib . Wound Prevention Checklist    Patient: Khadra Salinas (58 y.o. male)  Date: 3/4/2023  Diagnosis: Atrial fibrillation ([address] [I48.91]  New onset atrial fibrillation ([address] [I48.91]  New onset a-fib ([address] [I48.91]  Uncontrolled hypertension [I10]  Morbid obesity with BMI of 70 and over, adult ([address] [E66.01, Z68.45]  Atrial fibrillation, unspecified type ([address] [I48.91] Chronic atrial fibrillation ([address]    Precautions:         []  Heel prevention boots placed on patient    []  Patient turned q2h during shift    []  Lift team ordered    []  Patient on [place] bed/Specialty bed    [x]  Each Wound is documented during shift (Stage, Color, drainage, odor, measurements, and dressings)    [x]  Dual skin check done with [facility] Maru Manning RN     2000: Shift assessment done. V/S obtained. Pt resting quietly in bed. A/Ox4. No c/o pain or SOB. Call light within reach    2053: Scheduled med given. No complaints. 2200: Pt resting quietly in bed, watching TV. No distress noted at this time. 0000: No changes from previous assessment. Pt resting quietly in bed. No distress noted at this time. Call light within reach. 0200: Pt sleeping comfortably. No distress noted    0400:No changes from previous assessment. Pt sleeping in bed comfortably. No distress noted at this time. Call light within reach. 0715: Bedside shift change report given to ALBA Cervantes (oncoming nurse) by Julieta Denson (offgoing nurse). Report included the following information SBAR, Kardex, Intake/Output, MAR, Recent Results, and Cardiac Rhythm A-fib .

## 2023-03-05 VITALS
RESPIRATION RATE: 18 BRPM | HEIGHT: 73 IN | TEMPERATURE: 98.1 F | HEART RATE: 67 BPM | SYSTOLIC BLOOD PRESSURE: 124 MMHG | OXYGEN SATURATION: 93 % | DIASTOLIC BLOOD PRESSURE: 76 MMHG | BODY MASS INDEX: 41.75 KG/M2 | WEIGHT: 315 LBS

## 2023-03-05 LAB
ANION GAP SERPL CALC-SCNC: 7 MMOL/L (ref 3–18)
BASOPHILS # BLD: 0.1 K/UL (ref 0–0.1)
BASOPHILS NFR BLD: 1 % (ref 0–2)
BUN SERPL-MCNC: 19 MG/DL (ref 7–18)
BUN/CREAT SERPL: 15 (ref 12–20)
CALCIUM SERPL-MCNC: 9.1 MG/DL (ref 8.5–10.1)
CHLORIDE SERPL-SCNC: 99 MMOL/L (ref 100–111)
CHOLEST SERPL-MCNC: 186 MG/DL
CO2 SERPL-SCNC: 32 MMOL/L (ref 21–32)
CREAT SERPL-MCNC: 1.3 MG/DL (ref 0.6–1.3)
DIFFERENTIAL METHOD BLD: ABNORMAL
EOSINOPHIL # BLD: 0.1 K/UL (ref 0–0.4)
EOSINOPHIL NFR BLD: 1 % (ref 0–5)
ERYTHROCYTE [DISTWIDTH] IN BLOOD BY AUTOMATED COUNT: 13.2 % (ref 11.6–14.5)
GLUCOSE SERPL-MCNC: 103 MG/DL (ref 74–99)
HCT VFR BLD AUTO: 48.6 % (ref 36–48)
HDLC SERPL-MCNC: 44 MG/DL (ref 40–60)
HDLC SERPL: 4.2 (ref 0–5)
HGB BLD-MCNC: 16.5 G/DL (ref 13–16)
IMM GRANULOCYTES # BLD AUTO: 0 K/UL (ref 0–0.04)
IMM GRANULOCYTES NFR BLD AUTO: 0 % (ref 0–0.5)
LDLC SERPL CALC-MCNC: 125.6 MG/DL (ref 0–100)
LIPID PANEL: ABNORMAL
LYMPHOCYTES # BLD: 1.9 K/UL (ref 0.9–3.6)
LYMPHOCYTES NFR BLD: 30 % (ref 21–52)
MAGNESIUM SERPL-MCNC: 2.1 MG/DL (ref 1.6–2.6)
MCH RBC QN AUTO: 29.4 PG (ref 24–34)
MCHC RBC AUTO-ENTMCNC: 34 G/DL (ref 31–37)
MCV RBC AUTO: 86.6 FL (ref 78–100)
MONOCYTES # BLD: 0.6 K/UL (ref 0.05–1.2)
MONOCYTES NFR BLD: 9 % (ref 3–10)
NEUTS SEG # BLD: 3.9 K/UL (ref 1.8–8)
NEUTS SEG NFR BLD: 60 % (ref 40–73)
NRBC # BLD: 0 K/UL (ref 0–0.01)
NRBC BLD-RTO: 0 PER 100 WBC
PLATELET # BLD AUTO: 175 K/UL (ref 135–420)
PMV BLD AUTO: 11.8 FL (ref 9.2–11.8)
POTASSIUM SERPL-SCNC: 3.5 MMOL/L (ref 3.5–5.5)
RBC # BLD AUTO: 5.61 M/UL (ref 4.35–5.65)
SODIUM SERPL-SCNC: 138 MMOL/L (ref 136–145)
TRIGL SERPL-MCNC: 82 MG/DL
VLDLC SERPL CALC-MCNC: 16.4 MG/DL
WBC # BLD AUTO: 6.5 K/UL (ref 4.6–13.2)

## 2023-03-05 PROCEDURE — 6370000000 HC RX 637 (ALT 250 FOR IP): Performed by: STUDENT IN AN ORGANIZED HEALTH CARE EDUCATION/TRAINING PROGRAM

## 2023-03-05 PROCEDURE — 94761 N-INVAS EAR/PLS OXIMETRY MLT: CPT

## 2023-03-05 PROCEDURE — 83735 ASSAY OF MAGNESIUM: CPT

## 2023-03-05 PROCEDURE — 6370000000 HC RX 637 (ALT 250 FOR IP): Performed by: PHYSICIAN ASSISTANT

## 2023-03-05 PROCEDURE — 80048 BASIC METABOLIC PNL TOTAL CA: CPT

## 2023-03-05 PROCEDURE — 80061 LIPID PANEL: CPT

## 2023-03-05 PROCEDURE — 2580000003 HC RX 258: Performed by: STUDENT IN AN ORGANIZED HEALTH CARE EDUCATION/TRAINING PROGRAM

## 2023-03-05 PROCEDURE — 85025 COMPLETE CBC W/AUTO DIFF WBC: CPT

## 2023-03-05 PROCEDURE — 6360000002 HC RX W HCPCS: Performed by: STUDENT IN AN ORGANIZED HEALTH CARE EDUCATION/TRAINING PROGRAM

## 2023-03-05 PROCEDURE — 6370000000 HC RX 637 (ALT 250 FOR IP): Performed by: INTERNAL MEDICINE

## 2023-03-05 PROCEDURE — 36415 COLL VENOUS BLD VENIPUNCTURE: CPT

## 2023-03-05 PROCEDURE — 99232 SBSQ HOSP IP/OBS MODERATE 35: CPT | Performed by: INTERNAL MEDICINE

## 2023-03-05 RX ORDER — FUROSEMIDE 40 MG/1
40 TABLET ORAL DAILY
Qty: 60 TABLET | Refills: 3 | Status: SHIPPED | OUTPATIENT
Start: 2023-03-06 | End: 2023-03-05 | Stop reason: SDUPTHER

## 2023-03-05 RX ORDER — METOPROLOL TARTRATE 50 MG/1
50 TABLET, FILM COATED ORAL 2 TIMES DAILY
Qty: 60 TABLET | Refills: 3 | Status: SHIPPED | OUTPATIENT
Start: 2023-03-05 | End: 2023-03-05 | Stop reason: SDUPTHER

## 2023-03-05 RX ORDER — METOPROLOL TARTRATE 50 MG/1
50 TABLET, FILM COATED ORAL 2 TIMES DAILY
Qty: 60 TABLET | Refills: 3 | Status: SHIPPED | OUTPATIENT
Start: 2023-03-05

## 2023-03-05 RX ORDER — FUROSEMIDE 40 MG/1
40 TABLET ORAL DAILY
Qty: 60 TABLET | Refills: 3 | Status: SHIPPED | OUTPATIENT
Start: 2023-03-06

## 2023-03-05 RX ADMIN — FUROSEMIDE 40 MG: 40 TABLET ORAL at 08:20

## 2023-03-05 RX ADMIN — METOPROLOL TARTRATE 50 MG: 50 TABLET, FILM COATED ORAL at 08:20

## 2023-03-05 RX ADMIN — SODIUM CHLORIDE, PRESERVATIVE FREE 10 ML: 5 INJECTION INTRAVENOUS at 08:22

## 2023-03-05 RX ADMIN — FAMOTIDINE 20 MG: 20 TABLET ORAL at 08:20

## 2023-03-05 RX ADMIN — SACUBITRIL AND VALSARTAN 1 TABLET: 24; 26 TABLET, FILM COATED ORAL at 08:20

## 2023-03-05 RX ADMIN — ENOXAPARIN SODIUM 60 MG: 100 INJECTION SUBCUTANEOUS at 08:20

## 2023-03-05 ASSESSMENT — PAIN SCALES - GENERAL
PAINLEVEL_OUTOF10: 0
PAINLEVEL_OUTOF10: 0

## 2023-03-05 NOTE — PROGRESS NOTES
Bedside and Verbal shift change report given by Gloria Louie RN (offgoing nurse). Report included the following information Nurse Handoff Report, Intake/Output, Recent Results, and Cardiac Rhythm Paced . 1947: AOX4, on room air, resting in bed watching tv; with regular, nonlabored breathing; denies any pain or other discomforts; shift assessment completed    2039: due meds given as scheduled; no needs voiced at this time    2300: sleeping; no distress noted    2330: reassessment done; no changes noted from previous assessment    0200: sleeping; needs within reach    0328: reassessment done; no changes noted    0620: resting comfortably; linens changed    Bedside and Verbal shift change report given to Helen (oncoming nurse) by Master White (offgoing nurse). Report included the following information Nurse Handoff Report, Index, Recent Results, and Cardiac Rhythm Paced, Afib .       Wound Prevention Checklist    Patient: Carmina Cronin (58 y.o. male)  Date: 3/5/2023  Diagnosis: Atrial fibrillation ([address] [I48.91]  New onset atrial fibrillation ([address] [I48.91]  New onset a-fib ([address] [I48.91]  Uncontrolled hypertension [I10]  Morbid obesity with BMI of 70 and over, adult ([address] [E66.01, Z68.45]  Atrial fibrillation, unspecified type ([address] [I48.91] Chronic atrial fibrillation ([address]    Precautions:         []  Heel prevention boots placed on patient    [x]  Patient turned q2h during shift    []  Lift team ordered    [x]  Patient on [place] bed/Specialty bed    []  Each Wound is documented during shift (Stage, Color, drainage, odor, measurements, and dressings)    [x]  Dual skin check done with Helen Quintana RN

## 2023-03-05 NOTE — PROGRESS NOTES
0710 Bedside shift change report given to [facility] (oncoming nurse) by Pat Rausch (offgoing nurse). Report included the following information Nurse Handoff Report, Index, ED Encounter Summary, ED SBAR, Adult Overview, Intake/Output, MAR, Recent Results, Med Rec Status, and Cardiac Rhythm Afib/VPaced .

## 2023-03-05 NOTE — PROGRESS NOTES
[facility] - Progress Note  Admit Date: 3/1/2023    Assessment:     -Afib, chronic per pt report, dating back to 2013 when had PPM implanted  -S/p Deborah HARRELL Biotronik PPM 10/2013 [facility], Dr. Ananda Garcias)  PPM changed to VVI this admission. -HFpEF. Echo 3/2/2023 with LVEF 55-60% with normal wall motion, normal LV size, RV with reduced systolic function  -History of \"mild heart attack\" in 2013, around time of PPM implantation, pt does not recall having any stress test/cardiac cath at that time  -HTN, uncontrolled, /109 on presentation 3/1/2023. Improved. -Morbid obesity, weight loss advised. Atrial fibrillation CHADSVASC2 score stroke risk:   61 y.o.                                        <65 - 0   male                                         Male - 0  CHF hx                                           Yes - 1  HTN hx                                           Yes - 1  Stroke/TIA/Thromboembolism       No - 0  Vascular disease hx                       No - 0  Diabetes Mellitus                            No - 0   CHADSVASC2 score                    2      Annual Stroke Risk 2.2%- moderate-high          No Primary cardiologist; consult by Dr. Froilan aPtel:     I recommended Xarelto 20 mg daily for stroke prevention, stable on oral diuretics. Patient willing to take as an outpatient. Cost discussed. Risk benefits alternatives discussed. No plans for revision of atrial lead at this time - I can see as outpatient. Dispo planning. Subjective:     No new complaints.      Objective:      Patient Vitals for the past 8 hrs:   Temp Pulse Resp BP SpO2   03/05/23 0724 -- -- -- 125/86 --   03/05/23 0722 98 °F (36.7 °C) 83 13 (!) 122/93 92 %   03/05/23 0400 98.3 °F (36.8 °C) 89 18 107/78 91 %   03/05/23 0359 -- 84 -- -- 95 %   03/05/23 0358 -- 76 -- -- 95 %   03/05/23 0357 -- 80 -- -- 91 %   03/05/23 0356 -- 70 -- -- 91 %         Patient Vitals for the past 96 hrs:   Weight 03/02/23 1441 (!) 558 lb (253.1 kg)   03/02/23 0230 (!) 558 lb (253.1 kg)   03/01/23 1825 (!) 558 lb (253.1 kg)                    Intake/Output Summary (Last 24 hours) at 3/5/2023 0915  Last data filed at 3/5/2023 2728  Gross per 24 hour   Intake 1320 ml   Output 2850 ml   Net -1530 ml       Physical Exam:  General:  alert, appears stated age, and cooperative  Neck:  nontender  Lungs:  clear to auscultation bilaterally  Heart:  regular rate and rhythm, S1, S2 normal, no murmur, click, rub or gallop  Abdomen:  abdomen is soft without significant tenderness, masses, organomegaly or guarding  Extremities:  extremities normal, atraumatic, no cyanosis or edema    Data Review:   Last Left ventricular Function (EF):    Lab Results   Component Value Date    LVEF 58 03/02/2023       Labs: Results:       Chemistry Recent Labs     03/03/23  0603 03/04/23  0520 03/05/23  0535    137 138   K 3.6 3.2* 3.5    100 99*   CO2 31 31 32   BUN 15 17 19*   MG 2.2 2.1 2.1   GLOB 3.6  --   --       CBC w/Diff Recent Labs     03/03/23  0603 03/04/23  0520 03/05/23  0535   WBC 5.7 6.7 6.5   RBC 5.00 5.24 5.61   HGB 14.7 15.5 16.5*   HCT 44.7 46.6 48.6*    167 175      Critical Labs [unfilled]   Coagulation No results for input(s): INR, APTT in the last 72 hours. Invalid input(s): PTP    Lipid Panel Lab Results   Component Value Date/Time    CHOL 145 03/01/2023 10:56 PM    HDL 46 03/01/2023 10:56 PM      BNP No results found for: BNP, BNPPOC   Liver Enzymes No results for input(s): TP, ALB in the last 72 hours.     Invalid input(s): TBIL, AP, SGOT, GPT, DBIL   Thyroid Studies No results found for: T4, TSH     Signed By: Migel Ricketts MD     March 5, 2023

## 2023-03-05 NOTE — PROGRESS NOTES
Physician Progress Note      PATIENT:               Dannielle Anderson  CSN #:                  [id number]  :                       1965  ADMIT DATE:       3/1/2023 6:20 PM  [address] DATE:        3/5/2023 12:26 PM  RESPONDING  PROVIDER #:        Tonya Posadas MD          QUERY TEXT:    Pt admitted with AFIB . Pt noted to also have Acute heart failure on chronic   preserved ejection fraction  . If possible, please document in progress notes   and discharge summary the type ,acuity and etiology of CHF, if able to be   determined. The medical record reflects the following:    Risk Factors: Morbid obesity BMI 73.6 , Paroxysmal atrial fibrillation with   rapid ventricular response , Hypertension -stage II pressure    Clinical Indicators: CARD MD PN ASSESSMENT  Acute heart failure on chronic preserved ejection fraction -bilateral lower   extremity edema, dyspnea on exertion  Paroxysmal atrial fibrillation with rapid ventricular response ,Hx of   dual-chamber permanent pacemaker  and Pacemaker changed to VVI yesterday   afternoon,Hypertension -stage II pressure    Treatment: Monitor fluid status, fluid restrication 2L/day, salt intake <2g   per day. Cont' Lasix 40 mg IV twice daily, start Entresto 24-26 mg p.o.  BID   ,ECHO,EKG ,Card consult    Thank you  Melody Sullivan RN CRCR YOHAN  , [facility]  [email or web address]  Options provided:  -- acute on chronic diastolicCHF due to Hypertensive Heart Disease  -- acute on chronic diastolic CHF due to Hypertensive Heart Disease and CAD  -- acute on chronic diastolicCHF not due to Hypertension but due to CAD  -- acute on chronic diastolicCHF due to Hypertensive Heart Disease and ICMP  -- acute on chronic diastolicCHF not due to Hypertension but due to ICMP  -- acute on chronic diastolic CHF due to Hypertensive Heart Disease and   Valvular Heart Disease  -- acute on chronic diastolicCHF not due to Hypertension but due to valvular   heart disease  -- acute on chronic diastolic  CHF due to HTN, CAD, ICMP and valvular disease  -- Other - I will add my own diagnosis  -- Disagree - Not applicable / Not valid  -- Disagree - Clinically unable to determine / Unknown  -- Refer to Clinical Documentation Reviewer    PROVIDER RESPONSE TEXT:    This patient has acute on chronic diastolic CHF due to hypertensive heart   disease. Query created by: Traci Garcia on 3/3/2023 2:24 PM      Electronically signed by:   Andrae Diaz MD 3/5/2023 1:58 PM

## 2023-03-05 NOTE — PROGRESS NOTES
Progress Note    Patient: Khadra Salinas MRN: [id number]  CSN: [id number]    YOB: 1965  Age: 61 y.o. Sex: male    DOA: 3/1/2023 LOS:  LOS: 4 days                    Subjective:   Doing well. No complaints. Dispo planning    Will transition to PO xarelto. Blood pressure improving. On PO lasix, Entresto. Pacemaker interrogated. Per Cardiology, PPM interrogation revealed high impendence to atrial lead. Pacemaker subsequently changed to VVI pacing. Discussed plan to initiate oral anticoagulation with patient, per cardiology recommendation. HPI:  Khadra Salinas is a 61 y.o. male who has past medical history of hypertension, pacemaker for unclear reason, morbid obesity, sent to the ER from the clinic by myself, his PCP who recently reestablished care after 3 years of not being seen by his providers. EKG done in the clinic which showed A-fib with RVR with heart rates 1 13-1 20. In the clinic, patient was given 10 mg of Bystolic. Upon arrival to the emergency room his heart rate had improved slightly but was still tachycardic, no further interventions were done and heart rate improved, currently rate controlled. Patient has no acute complaints other than he has poor exercise tolerance. Is unable to walk more than a few steps before becoming short of breath. Has difficulty with mobility and simple tasks due to his weight. Of note, weight has been stable since his last clinic visit in 2019. Presented to the clinic with complaints of bilateral lower leg swelling and pain. Says that the leg swelling is always there but is worse at this time than it has been in the past.  Denies any fevers, chills, headache, nausea, vomiting, chest pain, dizziness, or loss of consciousness. Patient was reluctant to come to the emergency room however came by ambulance. JOSELYN: 3/2/23:  Left Ventricle Normal left ventricular systolic function with a visually estimated EF of 55 - 60%.  Left ventricle size is normal. Severe septal thickening. Moderate posterior thickening. Normal wall motion. Left Atrium Not well visualized. Interatrial Septum Interatrial septum was not well visualized. Right Ventricle Not well visualized. Reduced systolic function. TAPSE is abnormal. TAPSE is 1.6 cm. RV Peak S' is 10 cm/s. Right Atrium Not well visualized. Aortic Valve Valve structure is normal. No regurgitation. No stenosis. Mitral Valve Valve structure is normal. No regurgitation. No stenosis noted. Tricuspid Valve Valve structure is normal. No regurgitation. No stenosis noted. Unable to assess RVSP due to inadequate or insignificant tricuspid regurgitation. Pulmonic Valve Valve structure is normal. No regurgitation. No stenosis noted. Pulmonary Artery Pulmonary hypertension not present. Aorta Dilated aortic root. Ao root diameter is 3.7 cm. Dilated ascending aorta. Ao ascending diameter is 4.1 cm. IVC/Hepatic Veins IVC diameter is greater than 21 mm and decreases greater than 50% during inspiration; therefore the estimated right atrial pressure is intermediate (~8 mmHg). IVC is dilated. Pericardium The pericardium is normal. No pericardial effusion. Chief Complaint:   Chief Complaint   Patient presents with    Irregular Heart Beat     Patient was sent from PMD after he was found in afib when they did EKG, has not seen a dr. In years pt has PM for about 10 yrs        Review of systems  General: No fevers or chills. Cardiovascular: KASPER, +BLE edema. No chest pain or pressure. No palpitations. Pulmonary: No cough or wheeze. Gastrointestinal: No abdominal pain, nausea, vomiting or diarrhea. Genitourinary: No urinary frequency, urgency, hesitancy or dysuria. Musculoskeletal: No joint or muscle pain, no back pain, no recent trauma. Neurologic: No headache, numbness, tingling or weakness.      Objective:     Physical Exam:  Visit Vitals  /86   Pulse 83   Temp 98 °F (36.7 °C)   Resp 13   Ht 6' 1\" (1.854 m)   Wt (!) 558 lb (253.1 kg)   SpO2 92%   BMI 73.62 kg/m²        General:         Morbidly obese, alert, cooperative, no acute distress    HEENT: NC, Atraumatic. PERRLA, anicteric sclerae. Lungs: CTA Bilaterally. No Wheezing/Rhonchi/Rales. Heart:  Irregularly irregular  rhythm,  No murmur, No Rubs, No Gallops  Abdomen: Obese, Soft, Non distended, Non tender. +Bowel sounds, no HSM  Extremities: Chronic venous changes BLE: healing ulcer L pannus, +1 BLE pitting edema   Psych:   Not anxious or agitated. Neurologic:  CN 2-12 grossly intact, Alert and oriented X 3. No acute neurological  Deficits,     Intake and Output:  Current Shift:  03/05 0701 - 03/05 1900  In: 600 [P.O.:600]  Out: -   Last three shifts:  03/03 1901 - 03/05 0700  In: 1200 [P.O.:1200]  Out: 5050 [Urine:5050]    Labs: Results:       Chemistry Recent Labs     03/03/23  0603 03/04/23  0520 03/05/23  0535    137 138   K 3.6 3.2* 3.5    100 99*   CO2 31 31 32   BUN 15 17 19*   GLOB 3.6  --   --       CBC w/Diff Recent Labs     03/03/23  0603 03/04/23  0520 03/05/23  0535   WBC 5.7 6.7 6.5   RBC 5.00 5.24 5.61   HGB 14.7 15.5 16.5*   HCT 44.7 46.6 48.6*    167 175      Cardiac Enzymes No results for input(s): CPK, VJ in the last 72 hours. Invalid input(s): CKRMB, CKND1, TROIP   Coagulation No results for input(s): INR, APTT in the last 72 hours. Invalid input(s): PTP    Lipid Panel Lab Results   Component Value Date/Time    CHOL 145 03/01/2023 10:56 PM    HDL 46 03/01/2023 10:56 PM      BNP Invalid input(s): BNPP   Liver Enzymes No results for input(s): TP, ALB in the last 72 hours.     Invalid input(s): TBIL, AP, SGOT, GPT, DBIL   Thyroid Studies No results found for: T4, TSH       Procedures/imaging: see electronic medical records for all procedures/Xrays and details which were not copied into this note but were reviewed prior to creation of Plan    Medications:   Current Facility-Administered Medications Medication Dose Route Frequency    furosemide (LASIX) tablet 40 mg  40 mg Oral Daily    sacubitril-valsartan (ENTRESTO) 24-26 MG per tablet 1 tablet  1 tablet Oral BID    sodium chloride flush 0.9 % injection 5-40 mL  5-40 mL IntraVENous 2 times per day    sodium chloride flush 0.9 % injection 5-40 mL  5-40 mL IntraVENous PRN    0.9 % sodium chloride infusion   IntraVENous PRN    enoxaparin (LOVENOX) injection 60 mg  60 mg SubCUTAneous BID    ondansetron (ZOFRAN-ODT) disintegrating tablet 4 mg  4 mg Oral Q8H PRN    Or    ondansetron (ZOFRAN) injection 4 mg  4 mg IntraVENous Q6H PRN    polyethylene glycol (GLYCOLAX) packet 17 g  17 g Oral Daily PRN    acetaminophen (TYLENOL) tablet 650 mg  650 mg Oral Q6H PRN    Or    acetaminophen (TYLENOL) suppository 650 mg  650 mg Rectal Q6H PRN    famotidine (PEPCID) tablet 20 mg  20 mg Oral BID    metoprolol tartrate (LOPRESSOR) tablet 50 mg  50 mg Oral BID       Assessment/Plan     Principal Problem:    Chronic atrial fibrillation (HCC)  Active Problems:    Chronic a-fib (HCC)    Acute on chronic heart failure with preserved ejection fraction (HCC)    Hypertension  Resolved Problems:    * No resolved hospital problems.  *    Atrial fibrillation with RVR:  History of pacemaker:  -S/p Deborah HARRELL [employer]ron[employer] PPM 10/2013 EMILIANA TAYLOR [facility], Dr. Casey Varner)  -HFpEF, chronic, Echo 3/2/2023 with LVEF 55-60% with normal wall motion, normal LV size, RV with reduced systolic function  Not previously on any medications  New diagnosis of A-fib, currently rate controlled  Trop: 24, 31  3/1 D-Dimer 0.37  3/2 TSH 1.71  3/2 Echo EF 58%, Severe septal thickening, Dilated aortic root (3.7 cm), ascending aorta (4.1 cm)  3/2 PPM interrogation: high impendence to atrial lead  PPM changed to VVI pacing  Metoprolol 50 mg PO BID  Entresto 24-26 mg PO BID  Lasix 40 mg po daily -- transition to PO   -- start xarelto 3/5/23; dc lovenox  Plan for oral anticoagulation, per cardiology  Consider duplex ultrasound of bilateral lower extremities  Monitor fluid status, fluid restrication 2L/day, salt intake <2g per day  Will need outpatient sleep study for suspected SHELBY. Cardiology, Dr Abelino Griffith  - -will need outpatient follow up. Hypertension:  Controlled on metoprolol, Entresto; will continue. Obesity, Morbid: We will continue to watch /treat outpatient   Diet and lifestyle counselling  PT OT  Will need outpatient sleep study for suspected SHELBY. NEEDS BARIATRIC CRUTCHES ON DISCHARGE FOR SAFE AMBULATION        Bibi Wiggins MD  3/5/2023 9:48 AM      Cardiology following, started on Entresto,  lasix 40mg PO daily; start xarelto and dc home. Continue metoprolol 50mg BID. Follow up outpatient with Dr Nury Mackey blood pressure  AM labs: CBC, CMP, Mg  Will need outpatient sleep study for suspected SHELBY.

## 2023-03-05 NOTE — DISCHARGE SUMMARY
@[email or web address]    Discharge Summary     Patient: Moshe Gutierrez MRN: [id number]  SSN: [id number]    YOB: 1965  Age: 61 y.o. Sex: male       Admit Date: 3/1/2023    Discharge Date: 3/5/2023      Admission Diagnoses: Atrial fibrillation ([address] [I48.91]  New onset atrial fibrillation ([address] [I48.91]  New onset a-fib ([address] [I48.91]  Uncontrolled hypertension [I10]  Morbid obesity with BMI of 70 and over, adult [facility] [E66.01, Z68.45]  Atrial fibrillation, unspecified type ([facility] [I48.91]    Discharge Diagnoses:    Atrial Fibrillition  HFpEF EF 55-60%  HTN  Morbid obesity  Hx of Pacemaker      Discharge Condition: Good    Hospital Course: admitted for a fib, RVR while in the clinic. Started on PO metoprolol and rate controlled. Cardiology consulted. Pacemaker interrogated and changed to VVI. Diuresed with IV lasix, transitioned to PO. Tolerated all well. DC home with xarelto and 40mg PO lasix, metoprolol, and entresto. Hospital course uneventful. HPI:  Moshe Gutierrez is a 61 y.o. male who has past medical history of hypertension, pacemaker for unclear reason, morbid obesity, sent to the ER from the clinic by myself, his PCP who recently reestablished care after 3 years of not being seen by his providers. EKG done in the clinic which showed A-fib with RVR with heart rates 1 13-1 20. In the clinic, patient was given 10 mg of Bystolic. Upon arrival to the emergency room his heart rate had improved slightly but was still tachycardic, no further interventions were done and heart rate improved, currently rate controlled. Patient has no acute complaints other than he has poor exercise tolerance. Is unable to walk more than a few steps before becoming short of breath. Has difficulty with mobility and simple tasks due to his weight. Of note, weight has been stable since his last clinic visit in 2019. Presented to the clinic with complaints of bilateral lower leg swelling and pain.   Says that the leg swelling is always there but is worse at this time than it has been in the past.  Denies any fevers, chills, headache, nausea, vomiting, chest pain, dizziness, or loss of consciousness. Patient was reluctant to come to the emergency room however came by ambulance. Consults: Cardiology    Significant Diagnostic Studies:   JOSELYN: 3/2/23:  Left Ventricle Normal left ventricular systolic function with a visually estimated EF of 55 - 60%. Left ventricle size is normal. Severe septal thickening. Moderate posterior thickening. Normal wall motion. Left Atrium Not well visualized. Interatrial Septum Interatrial septum was not well visualized. Right Ventricle Not well visualized. Reduced systolic function. TAPSE is abnormal. TAPSE is 1.6 cm. RV Peak S' is 10 cm/s. Right Atrium Not well visualized. Aortic Valve Valve structure is normal. No regurgitation. No stenosis. Mitral Valve Valve structure is normal. No regurgitation. No stenosis noted. Tricuspid Valve Valve structure is normal. No regurgitation. No stenosis noted. Unable to assess RVSP due to inadequate or insignificant tricuspid regurgitation. Pulmonic Valve Valve structure is normal. No regurgitation. No stenosis noted. Pulmonary Artery Pulmonary hypertension not present. Aorta Dilated aortic root. Ao root diameter is 3.7 cm. Dilated ascending aorta. Ao ascending diameter is 4.1 cm. IVC/Hepatic Veins IVC diameter is greater than 21 mm and decreases greater than 50% during inspiration; therefore the estimated right atrial pressure is intermediate (~8 mmHg). IVC is dilated. Pericardium The pericardium is normal. No pericardial effusion.          Pacemaker interrogated, changed to VVI on 3/2/2023      Physical Examination:   GENERAL ASSESSMENT: well developed , morbidly obese  SKIN: normal color, no lesions  HEAD: normocephalic  EYES: normal eyes  EARS: normal  NOSE: normal external appearance and nares patent  MOUTH: normal mouth and throat  NECK: normal  CHEST: normal air exchange, no rales, no rhonchi, no wheezes, respiratory effort normal with no retractions  HEART: regular rate and rhythm, normal S1/S2, no murmurs  ABDOMEN: soft, non-distended, no masses, no hepatosplenomegaly  EXTREMITY: normal and symmetric movement, normal range of motion, no joint swelling  NEURO: No focal deficits    Disposition: home    Discharge Medications:   Current Discharge Medication List        START taking these medications    Details   rivaroxaban (XARELTO) 20 MG TABS tablet Take 1 tablet by mouth daily  Qty: 90 tablet, Refills: 1      metoprolol tartrate (LOPRESSOR) 50 MG tablet Take 1 tablet by mouth 2 times daily  Qty: 60 tablet, Refills: 3      sacubitril-valsartan (ENTRESTO) 24-26 MG per tablet Take 1 tablet by mouth 2 times daily  Qty: 180 tablet, Refills: 1    Associated Diagnoses: Acute on chronic heart failure with preserved ejection fraction (HCC)      furosemide (LASIX) 40 MG tablet Take 1 tablet by mouth daily  Qty: 60 tablet, Refills: 3    Associated Diagnoses: Acute on chronic heart failure with preserved ejection fraction (HCC)           STOP taking these medications       ibuprofen (ADVIL;MOTRIN) 200 MG tablet Comments:   Reason for Stopping:               Atrial fibrillation with RVR:  History of pacemaker:  -S/p Deborah HARRELL [employer] PPM 10/2013 EMILIANA TAYLOR [facility], Dr. Enrrique Nieto)  -HFpEF, chronic, Echo 3/2/2023 with LVEF 55-60% with normal wall motion, normal LV size, RV with reduced systolic function  Not previously on any medications  New diagnosis of A-fib, currently rate controlled  Trop: 24, 31  3/1 D-Dimer 0.37  3/2 TSH 1.71  3/2 Echo EF 58%, Severe septal thickening, Dilated aortic root (3.7 cm), ascending aorta (4.1 cm)  3/2 PPM interrogation: high impendence to atrial lead  PPM changed to VVI pacing  Metoprolol 50 mg PO BID  Entresto 24-26 mg PO BID  Lasix 40 mg po daily -- transition to PO   -- start xarelto 3/5/23; dc lovenox  Plan for oral anticoagulation, per cardiology  Consider duplex ultrasound of bilateral lower extremities  Monitor fluid status, fluid restrication 2L/day, salt intake <2g per day  Will need outpatient sleep study for suspected SHELBY. Cardiology, Dr Haydee Odonnell  - -will need outpatient follow up. Hypertension:  Controlled on metoprolol, Entresto; will continue. Obesity, Morbid: We will continue to watch /treat outpatient   Diet and lifestyle counselling  PT OT  Will need outpatient sleep study for suspected SHELBY. Has CRUTCHES ON DISCHARGE FOR SAFE AMBULATION         Activity: activity as tolerated  Diet: cardiac diet  Wound Care: none needed    Follow up with Dr. Gunnar Vang, Cardiology within 1 month of discharge  Follow up with PCP, Dr. Carliss Soulier within 1 week of discharge. Continue new medications. Check CBC, CMP, Magnesium  Monitor leg swelling. Now on lasix 40mg PO daily  Now on entresto, metoprolol, xarelto.        Signed By: Wilma Shoemaker MD     March 5, 2023        Time for discharge greater than 40 minutes

## 2023-03-05 NOTE — CARE COORDINATION
CM spoke with patient, patient said his wife will be transporting him home today at time of discharge. No CM needs at this time.              Lawyer Karissa RN  Case Management [phone]

## 2023-03-05 NOTE — CARE COORDINATION
D/C order noted for today. Orders reviewed. No needs identified at this time. CM spoke with patient, patient said his wife will be transporting him home today at time of discharge. CM remains available if needed.            Otilio Tejeda [phone]